# Patient Record
Sex: FEMALE | Race: WHITE | Employment: FULL TIME | URBAN - NONMETROPOLITAN AREA
[De-identification: names, ages, dates, MRNs, and addresses within clinical notes are randomized per-mention and may not be internally consistent; named-entity substitution may affect disease eponyms.]

---

## 2018-06-08 ENCOUNTER — HOSPITAL ENCOUNTER (EMERGENCY)
Age: 18
Discharge: HOME | End: 2018-06-08
Payer: COMMERCIAL

## 2018-06-08 VITALS
BODY MASS INDEX: 25.92 KG/M2 | WEIGHT: 175 LBS | TEMPERATURE: 99.1 F | OXYGEN SATURATION: 98 % | HEART RATE: 116 BPM | RESPIRATION RATE: 16 BRPM | SYSTOLIC BLOOD PRESSURE: 127 MMHG | DIASTOLIC BLOOD PRESSURE: 72 MMHG | HEIGHT: 69 IN

## 2018-06-08 DIAGNOSIS — R11.2 NAUSEA VOMITING AND DIARRHEA: ICD-10-CM

## 2018-06-08 DIAGNOSIS — K52.9 GASTROENTERITIS: Primary | ICD-10-CM

## 2018-06-08 DIAGNOSIS — R19.7 NAUSEA VOMITING AND DIARRHEA: ICD-10-CM

## 2018-06-08 PROCEDURE — 6360000002 HC RX W HCPCS: Performed by: NURSE PRACTITIONER

## 2018-06-08 PROCEDURE — 99203 OFFICE O/P NEW LOW 30 MIN: CPT | Performed by: NURSE PRACTITIONER

## 2018-06-08 PROCEDURE — 99213 OFFICE O/P EST LOW 20 MIN: CPT

## 2018-06-08 RX ORDER — LOPERAMIDE HYDROCHLORIDE 2 MG/1
2 CAPSULE ORAL 4 TIMES DAILY PRN
Qty: 20 CAPSULE | Refills: 0 | Status: SHIPPED | OUTPATIENT
Start: 2018-06-08 | End: 2018-06-18

## 2018-06-08 RX ORDER — ONDANSETRON 4 MG/1
4 TABLET, ORALLY DISINTEGRATING ORAL ONCE
Status: COMPLETED | OUTPATIENT
Start: 2018-06-08 | End: 2018-06-08

## 2018-06-08 RX ORDER — ONDANSETRON 4 MG/1
4 TABLET, ORALLY DISINTEGRATING ORAL EVERY 8 HOURS PRN
Qty: 21 TABLET | Refills: 0 | Status: SHIPPED | OUTPATIENT
Start: 2018-06-08 | End: 2019-06-19

## 2018-06-08 RX ORDER — NORGESTIMATE AND ETHINYL ESTRADIOL 7DAYSX3 28
KIT ORAL
COMMUNITY
End: 2019-06-19

## 2018-06-08 RX ADMIN — ONDANSETRON 4 MG: 4 TABLET, ORALLY DISINTEGRATING ORAL at 11:08

## 2018-06-08 ASSESSMENT — ENCOUNTER SYMPTOMS
CONSTIPATION: 0
APNEA: 0
NAUSEA: 1
SINUS PRESSURE: 0
SHORTNESS OF BREATH: 0
VOMITING: 1
SORE THROAT: 0
RHINORRHEA: 0
BACK PAIN: 0
DIARRHEA: 1
COUGH: 0
ABDOMINAL PAIN: 0
PHOTOPHOBIA: 0

## 2019-06-19 ENCOUNTER — HOSPITAL ENCOUNTER (EMERGENCY)
Age: 19
Discharge: HOME | End: 2019-06-19
Payer: COMMERCIAL

## 2019-06-19 ENCOUNTER — HOSPITAL ENCOUNTER (EMERGENCY)
Dept: GENERAL RADIOLOGY | Age: 19
Discharge: HOME | End: 2019-06-19
Payer: COMMERCIAL

## 2019-06-19 VITALS
BODY MASS INDEX: 26.63 KG/M2 | SYSTOLIC BLOOD PRESSURE: 118 MMHG | OXYGEN SATURATION: 97 % | HEIGHT: 70 IN | DIASTOLIC BLOOD PRESSURE: 75 MMHG | HEART RATE: 77 BPM | WEIGHT: 186 LBS | TEMPERATURE: 98.6 F | RESPIRATION RATE: 16 BRPM

## 2019-06-19 DIAGNOSIS — S40.021A CONTUSION OF RIGHT UPPER ARM, INITIAL ENCOUNTER: Primary | ICD-10-CM

## 2019-06-19 PROCEDURE — 99213 OFFICE O/P EST LOW 20 MIN: CPT

## 2019-06-19 PROCEDURE — 99213 OFFICE O/P EST LOW 20 MIN: CPT | Performed by: NURSE PRACTITIONER

## 2019-06-19 PROCEDURE — 73060 X-RAY EXAM OF HUMERUS: CPT

## 2019-06-19 PROCEDURE — 73080 X-RAY EXAM OF ELBOW: CPT

## 2019-06-19 ASSESSMENT — ENCOUNTER SYMPTOMS
COLOR CHANGE: 0
NAUSEA: 0
SHORTNESS OF BREATH: 0
VOMITING: 0
COUGH: 0

## 2019-06-19 ASSESSMENT — PAIN DESCRIPTION - PAIN TYPE: TYPE: ACUTE PAIN

## 2019-06-19 ASSESSMENT — PAIN DESCRIPTION - FREQUENCY: FREQUENCY: CONTINUOUS

## 2019-06-19 ASSESSMENT — PAIN DESCRIPTION - LOCATION: LOCATION: ARM

## 2019-06-19 ASSESSMENT — PAIN DESCRIPTION - PROGRESSION: CLINICAL_PROGRESSION: GRADUALLY WORSENING

## 2019-06-19 ASSESSMENT — PAIN DESCRIPTION - DESCRIPTORS: DESCRIPTORS: ACHING

## 2019-06-19 ASSESSMENT — PAIN DESCRIPTION - ORIENTATION: ORIENTATION: RIGHT

## 2019-06-19 ASSESSMENT — PAIN SCALES - GENERAL: PAINLEVEL_OUTOF10: 5

## 2019-06-19 NOTE — ED PROVIDER NOTES
[facility]  Urgent Care Encounter       CHIEF COMPLAINT       Chief Complaint   Patient presents with    Fall    Arm Pain     right       Nurses Notes reviewed and I agree except as noted in the HPI. HISTORY  S Dionne Rankin is a 19 y.o. female who presents for evaluation of right upper arm and elbow pain. Patient states the pain began after a fall at work last night around 9:30 PM.  Patient states that she works for [employer] and that she was walking down steps at a residence in [facility] where she was working when she grabbed a handrail that was unsecured and fell onto her right arm. States that she has had pain ever since the incident. States that she has not taken any medications or done anything at home. This or tingling in the right upper extremity. The history is provided by the patient. REVIEW OF SYSTEMS     Review of Systems   Constitutional: Negative for chills and fever. Respiratory: Negative for cough and shortness of breath. Cardiovascular: Negative for chest pain. Gastrointestinal: Negative for nausea and vomiting. Musculoskeletal: Positive for arthralgias. Negative for joint swelling and myalgias. Skin: Negative for color change. Allergic/Immunologic: Negative for environmental allergies. Neurological: Negative for headaches. PAST MEDICAL HISTORY         Diagnosis Date    Depression        SURGICALHISTORY     Patient  has no past surgical history on file. CURRENT MEDICATIONS       Previous Medications    No medications on file       ALLERGIES     Patient is has No Known Allergies. Patients   There is no immunization history on file for this patient. FAMILY HISTORY     Patient's family history is not on file. SOCIAL HISTORY     Patient  reports that she has never smoked. She has never used smokeless tobacco. She reports that she does not drink alcohol or use drugs.     PHYSICAL EXAM     ED Sayda Francis on 6/19/2019 11:07 AM      XR ELBOW RIGHT (MIN 3 VIEWS)   Final Result   Normal right humerus. Normal right elbow. **This report has been created using voice recognition software. It may contain minor errors which are inherent in voice recognition technology. **      Final report electronically signed by Dr. Sayda Francis on 6/19/2019 11:07 AM            EKG: none      URGENT CARE COURSE:     Vitals:    06/19/19 1037   BP: 118/75   Pulse: 77   Resp: 16   Temp: 98.6 °F (37 °C)   SpO2: 97%   Weight: 186 lb (84.4 kg)   Height: 5' 10\" (1.778 m)       Medications - No data to display         PROCEDURES:  None    FINAL IMPRESSION      1. Contusion of right upper arm, initial encounter          DISPOSITION/ PLAN     X-rays are negative for any acute fracture per the read of the radiologist.  I did discuss with the patient that we will plan to treat conservatively with rest, ice and over-the-counter Tylenol but especially ibuprofen. Patient is advised to take 400 to 600 mg of ibuprofen every 8 hours at home consistently over the next week. Patient states that she feels as though that she cannot lift the objects that she has to lift at work on a normal daily basis and will therefore be given restrictions that she may not lift anything over 5 pounds until cleared by [facility] next week. She is agreeable to the plan as discussed. PATIENT REFERRED TO:  No primary care provider on file. No primary physician on file.       DISCHARGE MEDICATIONS:  New Prescriptions    No medications on file       Discontinued Medications    NORGESTIM-ETH ESTRAD TRIPHASIC (TRINESSA, 28,) 0.18/0.215/0.25 MG-35 MCG TABS    Take by mouth    ONDANSETRON (ZOFRAN ODT) 4 MG DISINTEGRATING TABLET    Take 1 tablet by mouth every 8 hours as needed for Nausea or Vomiting       Current Discharge Medication List          HOLLIE Santillan - CNP    (Please note that portions of this note were completed

## 2019-06-25 ENCOUNTER — HOSPITAL ENCOUNTER (OUTPATIENT)
Age: 19
Discharge: HOME | End: 2019-06-25
Payer: COMMERCIAL

## 2019-07-03 ENCOUNTER — HOSPITAL ENCOUNTER (EMERGENCY)
Age: 19
Discharge: HOME | End: 2019-07-03
Payer: COMMERCIAL

## 2020-02-24 ENCOUNTER — HOSPITAL ENCOUNTER (OUTPATIENT)
Dept: MRI IMAGING | Age: 20
Discharge: HOME | End: 2020-02-24
Payer: COMMERCIAL

## 2020-02-24 PROCEDURE — 73221 MRI JOINT UPR EXTREM W/O DYE: CPT

## 2021-03-25 ENCOUNTER — APPOINTMENT (OUTPATIENT)
Dept: CT IMAGING | Age: 21
DRG: 872 | End: 2021-03-25
Payer: COMMERCIAL

## 2021-03-25 ENCOUNTER — HOSPITAL ENCOUNTER (INPATIENT)
Age: 21
LOS: 2 days | Discharge: HOME | DRG: 872 | End: 2021-03-27
Attending: EMERGENCY MEDICINE | Admitting: FAMILY MEDICINE
Payer: COMMERCIAL

## 2021-03-25 ENCOUNTER — APPOINTMENT (OUTPATIENT)
Dept: ULTRASOUND IMAGING | Age: 21
DRG: 872 | End: 2021-03-25
Payer: COMMERCIAL

## 2021-03-25 DIAGNOSIS — N73.0 PID (ACUTE PELVIC INFLAMMATORY DISEASE): Primary | ICD-10-CM

## 2021-03-25 DIAGNOSIS — R65.10 SIRS (SYSTEMIC INFLAMMATORY RESPONSE SYNDROME) (HCC): ICD-10-CM

## 2021-03-25 LAB
ALBUMIN SERPL-MCNC: 4.2 G/DL (ref 3.5–5.1)
ALP BLD-CCNC: 72 U/L (ref 38–126)
ALT SERPL-CCNC: 9 U/L (ref 11–66)
ANION GAP SERPL CALCULATED.3IONS-SCNC: 12 MEQ/L (ref 8–16)
APTT: 32.1 SECONDS (ref 22–38)
AST SERPL-CCNC: 16 U/L (ref 5–40)
BACTERIA: ABNORMAL /HPF
BASOPHILS # BLD: 0.2 %
BASOPHILS ABSOLUTE: 0 THOU/MM3 (ref 0–0.1)
BILIRUB SERPL-MCNC: 0.4 MG/DL (ref 0.3–1.2)
BILIRUBIN URINE: NEGATIVE
BLOOD, URINE: ABNORMAL
BUN BLDV-MCNC: 8 MG/DL (ref 7–22)
CALCIUM SERPL-MCNC: 9.1 MG/DL (ref 8.5–10.5)
CASTS 2: ABNORMAL /LPF
CASTS UA: ABNORMAL /LPF
CHARACTER, URINE: CLEAR
CHLORIDE BLD-SCNC: 107 MEQ/L (ref 98–111)
CO2: 23 MEQ/L (ref 23–33)
COLOR: YELLOW
CREAT SERPL-MCNC: 0.6 MG/DL (ref 0.4–1.2)
CRYSTALS, UA: ABNORMAL
EOSINOPHIL # BLD: 0.1 %
EOSINOPHILS ABSOLUTE: 0 THOU/MM3 (ref 0–0.4)
EPITHELIAL CELLS, UA: ABNORMAL /HPF
ERYTHROCYTE [DISTWIDTH] IN BLOOD BY AUTOMATED COUNT: 13.2 % (ref 11.5–14.5)
ERYTHROCYTE [DISTWIDTH] IN BLOOD BY AUTOMATED COUNT: 44.2 FL (ref 35–45)
GFR SERPL CREATININE-BSD FRML MDRD: > 90 ML/MIN/1.73M2
GLUCOSE BLD-MCNC: 97 MG/DL (ref 70–108)
GLUCOSE URINE: NEGATIVE MG/DL
HCT VFR BLD CALC: 40.2 % (ref 37–47)
HEMOGLOBIN: 12.4 GM/DL (ref 12–16)
IMMATURE GRANS (ABS): 0.08 THOU/MM3 (ref 0–0.07)
IMMATURE GRANULOCYTES: 0.4 %
INR BLD: 1.03 (ref 0.85–1.13)
KETONES, URINE: NEGATIVE
KOH PREP: NORMAL
LACTIC ACID: 1.7 MMOL/L (ref 0.5–2.2)
LEUKOCYTE ESTERASE, URINE: NEGATIVE
LYMPHOCYTES # BLD: 9.4 %
LYMPHOCYTES ABSOLUTE: 1.8 THOU/MM3 (ref 1–4.8)
MCH RBC QN AUTO: 28.1 PG (ref 26–33)
MCHC RBC AUTO-ENTMCNC: 30.8 GM/DL (ref 32.2–35.5)
MCV RBC AUTO: 91.2 FL (ref 81–99)
MISCELLANEOUS 2: ABNORMAL
MONOCYTES # BLD: 3.5 %
MONOCYTES ABSOLUTE: 0.7 THOU/MM3 (ref 0.4–1.3)
MRSA SCREEN RT-PCR: NEGATIVE
NITRITE, URINE: NEGATIVE
NUCLEATED RED BLOOD CELLS: 0 /100 WBC
OSMOLALITY CALCULATION: 281.4 MOSMOL/KG (ref 275–300)
PH UA: 7 (ref 5–9)
PLATELET # BLD: 220 THOU/MM3 (ref 130–400)
PMV BLD AUTO: 10.3 FL (ref 9.4–12.4)
POTASSIUM REFLEX MAGNESIUM: 4.1 MEQ/L (ref 3.5–5.2)
PREGNANCY, SERUM: NEGATIVE
PROTEIN UA: NEGATIVE
RBC # BLD: 4.41 MILL/MM3 (ref 4.2–5.4)
RBC URINE: ABNORMAL /HPF
RENAL EPITHELIAL, UA: ABNORMAL
SEG NEUTROPHILS: 86.4 %
SEGMENTED NEUTROPHILS ABSOLUTE COUNT: 16.2 THOU/MM3 (ref 1.8–7.7)
SODIUM BLD-SCNC: 142 MEQ/L (ref 135–145)
SPECIFIC GRAVITY, URINE: 1.01 (ref 1–1.03)
TOTAL PROTEIN: 7.4 G/DL (ref 6.1–8)
TRICHOMONAS PREP: NORMAL
UROBILINOGEN, URINE: 0.2 EU/DL (ref 0–1)
VANCOMYCIN RESISTANT ENTEROCOCCUS: NEGATIVE
WBC # BLD: 18.8 THOU/MM3 (ref 4.8–10.8)
WBC UA: ABNORMAL /HPF
YEAST: ABNORMAL

## 2021-03-25 PROCEDURE — 87641 MR-STAPH DNA AMP PROBE: CPT

## 2021-03-25 PROCEDURE — 2060000000 HC ICU INTERMEDIATE R&B

## 2021-03-25 PROCEDURE — 87205 SMEAR GRAM STAIN: CPT

## 2021-03-25 PROCEDURE — 2580000003 HC RX 258: Performed by: EMERGENCY MEDICINE

## 2021-03-25 PROCEDURE — 6370000000 HC RX 637 (ALT 250 FOR IP): Performed by: EMERGENCY MEDICINE

## 2021-03-25 PROCEDURE — 74177 CT ABD & PELVIS W/CONTRAST: CPT

## 2021-03-25 PROCEDURE — 87220 TISSUE EXAM FOR FUNGI: CPT

## 2021-03-25 PROCEDURE — 87491 CHLMYD TRACH DNA AMP PROBE: CPT

## 2021-03-25 PROCEDURE — 85025 COMPLETE CBC W/AUTO DIFF WBC: CPT

## 2021-03-25 PROCEDURE — 87040 BLOOD CULTURE FOR BACTERIA: CPT

## 2021-03-25 PROCEDURE — 76830 TRANSVAGINAL US NON-OB: CPT

## 2021-03-25 PROCEDURE — 85730 THROMBOPLASTIN TIME PARTIAL: CPT

## 2021-03-25 PROCEDURE — 2580000003 HC RX 258: Performed by: FAMILY MEDICINE

## 2021-03-25 PROCEDURE — 85610 PROTHROMBIN TIME: CPT

## 2021-03-25 PROCEDURE — 6360000004 HC RX CONTRAST MEDICATION: Performed by: EMERGENCY MEDICINE

## 2021-03-25 PROCEDURE — 87081 CULTURE SCREEN ONLY: CPT

## 2021-03-25 PROCEDURE — 6360000002 HC RX W HCPCS: Performed by: STUDENT IN AN ORGANIZED HEALTH CARE EDUCATION/TRAINING PROGRAM

## 2021-03-25 PROCEDURE — 84703 CHORIONIC GONADOTROPIN ASSAY: CPT

## 2021-03-25 PROCEDURE — 96374 THER/PROPH/DIAG INJ IV PUSH: CPT

## 2021-03-25 PROCEDURE — 99215 OFFICE O/P EST HI 40 MIN: CPT

## 2021-03-25 PROCEDURE — 99222 1ST HOSP IP/OBS MODERATE 55: CPT | Performed by: FAMILY MEDICINE

## 2021-03-25 PROCEDURE — 87070 CULTURE OTHR SPECIMN AEROBIC: CPT

## 2021-03-25 PROCEDURE — 87500 VANOMYCIN DNA AMP PROBE: CPT

## 2021-03-25 PROCEDURE — 6360000002 HC RX W HCPCS: Performed by: EMERGENCY MEDICINE

## 2021-03-25 PROCEDURE — 87077 CULTURE AEROBIC IDENTIFY: CPT

## 2021-03-25 PROCEDURE — 93975 VASCULAR STUDY: CPT

## 2021-03-25 PROCEDURE — 81001 URINALYSIS AUTO W/SCOPE: CPT

## 2021-03-25 PROCEDURE — 83605 ASSAY OF LACTIC ACID: CPT

## 2021-03-25 PROCEDURE — 6360000002 HC RX W HCPCS: Performed by: FAMILY MEDICINE

## 2021-03-25 PROCEDURE — 87210 SMEAR WET MOUNT SALINE/INK: CPT

## 2021-03-25 PROCEDURE — 96361 HYDRATE IV INFUSION ADD-ON: CPT

## 2021-03-25 PROCEDURE — 36415 COLL VENOUS BLD VENIPUNCTURE: CPT

## 2021-03-25 PROCEDURE — 99283 EMERGENCY DEPT VISIT LOW MDM: CPT

## 2021-03-25 PROCEDURE — 80053 COMPREHEN METABOLIC PANEL: CPT

## 2021-03-25 PROCEDURE — 87591 N.GONORRHOEAE DNA AMP PROB: CPT

## 2021-03-25 PROCEDURE — 96375 TX/PRO/DX INJ NEW DRUG ADDON: CPT

## 2021-03-25 PROCEDURE — 6370000000 HC RX 637 (ALT 250 FOR IP): Performed by: FAMILY MEDICINE

## 2021-03-25 RX ORDER — ACETAMINOPHEN 325 MG/1
650 TABLET ORAL EVERY 6 HOURS PRN
COMMUNITY

## 2021-03-25 RX ORDER — FENTANYL CITRATE 50 UG/ML
50 INJECTION, SOLUTION INTRAMUSCULAR; INTRAVENOUS ONCE
Status: COMPLETED | OUTPATIENT
Start: 2021-03-25 | End: 2021-03-25

## 2021-03-25 RX ORDER — 0.9 % SODIUM CHLORIDE 0.9 %
1000 INTRAVENOUS SOLUTION INTRAVENOUS ONCE
Status: COMPLETED | OUTPATIENT
Start: 2021-03-25 | End: 2021-03-25

## 2021-03-25 RX ORDER — ONDANSETRON 2 MG/ML
4 INJECTION INTRAMUSCULAR; INTRAVENOUS ONCE
Status: COMPLETED | OUTPATIENT
Start: 2021-03-25 | End: 2021-03-25

## 2021-03-25 RX ORDER — 0.9 % SODIUM CHLORIDE 0.9 %
500 INTRAVENOUS SOLUTION INTRAVENOUS ONCE
Status: COMPLETED | OUTPATIENT
Start: 2021-03-25 | End: 2021-03-25

## 2021-03-25 RX ORDER — MORPHINE SULFATE 2 MG/ML
1 INJECTION, SOLUTION INTRAMUSCULAR; INTRAVENOUS EVERY 8 HOURS PRN
Status: DISCONTINUED | OUTPATIENT
Start: 2021-03-25 | End: 2021-03-27 | Stop reason: HOSPADM

## 2021-03-25 RX ORDER — METRONIDAZOLE 500 MG/1
500 TABLET ORAL EVERY 12 HOURS SCHEDULED
Status: DISCONTINUED | OUTPATIENT
Start: 2021-03-25 | End: 2021-03-27 | Stop reason: HOSPADM

## 2021-03-25 RX ORDER — FENTANYL CITRATE 50 UG/ML
25 INJECTION, SOLUTION INTRAMUSCULAR; INTRAVENOUS ONCE
Status: COMPLETED | OUTPATIENT
Start: 2021-03-25 | End: 2021-03-25

## 2021-03-25 RX ORDER — KETOROLAC TROMETHAMINE 30 MG/ML
15 INJECTION, SOLUTION INTRAMUSCULAR; INTRAVENOUS ONCE
Status: COMPLETED | OUTPATIENT
Start: 2021-03-25 | End: 2021-03-25

## 2021-03-25 RX ORDER — KETOROLAC TROMETHAMINE 30 MG/ML
15 INJECTION, SOLUTION INTRAMUSCULAR; INTRAVENOUS ONCE
Status: DISCONTINUED | OUTPATIENT
Start: 2021-03-25 | End: 2021-03-25

## 2021-03-25 RX ORDER — HYDROCODONE BITARTRATE AND ACETAMINOPHEN 5; 325 MG/1; MG/1
1 TABLET ORAL EVERY 6 HOURS PRN
Status: DISCONTINUED | OUTPATIENT
Start: 2021-03-25 | End: 2021-03-25

## 2021-03-25 RX ORDER — DOXYCYCLINE HYCLATE 100 MG
100 TABLET ORAL ONCE
Status: COMPLETED | OUTPATIENT
Start: 2021-03-25 | End: 2021-03-25

## 2021-03-25 RX ORDER — ONDANSETRON 2 MG/ML
4 INJECTION INTRAMUSCULAR; INTRAVENOUS EVERY 6 HOURS PRN
Status: DISCONTINUED | OUTPATIENT
Start: 2021-03-25 | End: 2021-03-27 | Stop reason: HOSPADM

## 2021-03-25 RX ADMIN — KETOROLAC TROMETHAMINE 15 MG: 30 INJECTION, SOLUTION INTRAMUSCULAR at 12:37

## 2021-03-25 RX ADMIN — SODIUM CHLORIDE 1000 ML: 9 INJECTION, SOLUTION INTRAVENOUS at 15:00

## 2021-03-25 RX ADMIN — SODIUM CHLORIDE 1000 ML: 9 INJECTION, SOLUTION INTRAVENOUS at 13:00

## 2021-03-25 RX ADMIN — SODIUM CHLORIDE 500 ML: 9 INJECTION, SOLUTION INTRAVENOUS at 18:44

## 2021-03-25 RX ADMIN — CEFOXITIN SODIUM 2000 MG: 2 POWDER, FOR SOLUTION INTRAVENOUS at 15:49

## 2021-03-25 RX ADMIN — DOXYCYCLINE HYCLATE 100 MG: 100 TABLET, COATED ORAL at 15:49

## 2021-03-25 RX ADMIN — FENTANYL CITRATE 50 MCG: 50 INJECTION, SOLUTION INTRAMUSCULAR; INTRAVENOUS at 14:01

## 2021-03-25 RX ADMIN — KETOROLAC TROMETHAMINE 15 MG: 30 INJECTION, SOLUTION INTRAMUSCULAR at 14:01

## 2021-03-25 RX ADMIN — ONDANSETRON 4 MG: 2 INJECTION INTRAMUSCULAR; INTRAVENOUS at 14:01

## 2021-03-25 RX ADMIN — METRONIDAZOLE 500 MG: 500 TABLET ORAL at 19:55

## 2021-03-25 RX ADMIN — IOPAMIDOL 80 ML: 755 INJECTION, SOLUTION INTRAVENOUS at 14:58

## 2021-03-25 RX ADMIN — FENTANYL CITRATE 25 MCG: 50 INJECTION, SOLUTION INTRAMUSCULAR; INTRAVENOUS at 23:35

## 2021-03-25 RX ADMIN — HYDROCODONE BITARTRATE AND ACETAMINOPHEN 1 TABLET: 5; 325 TABLET ORAL at 18:09

## 2021-03-25 RX ADMIN — ONDANSETRON 4 MG: 2 INJECTION INTRAMUSCULAR; INTRAVENOUS at 18:09

## 2021-03-25 ASSESSMENT — ENCOUNTER SYMPTOMS
SHORTNESS OF BREATH: 0
BACK PAIN: 0
ABDOMINAL DISTENTION: 0
VOMITING: 0
TROUBLE SWALLOWING: 0
ABDOMINAL PAIN: 1
NAUSEA: 0
CONSTIPATION: 0
CHEST TIGHTNESS: 0
BLOOD IN STOOL: 0
ANAL BLEEDING: 0
STRIDOR: 0
VOICE CHANGE: 0
CHOKING: 0
APNEA: 0
COUGH: 0
WHEEZING: 0
DIARRHEA: 0

## 2021-03-25 ASSESSMENT — PAIN SCALES - GENERAL
PAINLEVEL_OUTOF10: 5
PAINLEVEL_OUTOF10: 10
PAINLEVEL_OUTOF10: 10
PAINLEVEL_OUTOF10: 7

## 2021-03-25 ASSESSMENT — PAIN DESCRIPTION - PROGRESSION
CLINICAL_PROGRESSION: NOT CHANGED
CLINICAL_PROGRESSION: NOT CHANGED

## 2021-03-25 ASSESSMENT — PAIN DESCRIPTION - FREQUENCY
FREQUENCY: CONTINUOUS
FREQUENCY: CONTINUOUS

## 2021-03-25 ASSESSMENT — PAIN DESCRIPTION - LOCATION: LOCATION: ABDOMEN

## 2021-03-25 ASSESSMENT — PAIN DESCRIPTION - PAIN TYPE: TYPE: ACUTE PAIN

## 2021-03-25 ASSESSMENT — PAIN DESCRIPTION - DESCRIPTORS: DESCRIPTORS: CRAMPING;SHARP

## 2021-03-25 NOTE — ED TRIAGE NOTES
To room with c/o period for the last 5 days with spotting 4 days before. She reports cramps on right side of abdomin with heavy bleeding and clots. She reports filling a supper plus tampon every hour a couple of days ago and it started again today. This is a very irregular period for her. She is not sexually active. Fatigue reported.

## 2021-03-25 NOTE — ED TRIAGE NOTES
Pt to the ED from [facility] with c/o vaginal bleeding. Pt states her period ended 4 days ago. States two days ago she began having heavy vaginal bleeding and is having to change a super heavy tampon ever 1.5 hours. States she is also have right sided abd cramping.

## 2021-03-25 NOTE — ED NOTES
ED to inpatient nurses report    Chief Complaint   Patient presents with    Vaginal Bleeding      Present to ED from home  LOC: alert and orientated to name, place, date  Vital signs   Vitals:    03/25/21 1223 03/25/21 1243 03/25/21 1358 03/25/21 1551   BP: (!) 99/53  107/72 (!) 101/57   Pulse: 114  122 125   Resp: 20  20 16   Temp:  99.9 °F (37.7 °C)     TempSrc:  Oral     SpO2: 99%  98% 97%   Weight:       Height:          Oxygen BaselineRA    Current needs required RA Bipap/Cpap No  LDAs:   Peripheral IV 03/25/21 Right Antecubital (Active)   Site Assessment Clean;Dry; Intact 03/25/21 1124   Line Status Flushed;Specimen collected;Normal saline locked 03/25/21 1124   Dressing Status Clean;Dry; Intact 03/25/21 1124   Dressing Intervention New 03/25/21 1124     Mobility: Requires assistance * 1  Pending ED orders: NA  Present condition: STABLE    Electronically signed by Bj Payne RN on 3/25/2021 at 4:06 PM     Bj Payne RN  03/25/21 0826

## 2021-03-25 NOTE — ED PROVIDER NOTES
[facility] EMERGENCY DEPT  Urgent Care Encounter      CHIEF COMPLAINT       Chief Complaint   Patient presents with    Vaginal Bleeding       Nurses Notes reviewed and I agree except as noted in the HPI. HISTORY [address] [facility] is a 23 y.o. The history is provided by the patient. No  was used. Vaginal Bleeding  Quality:  Bright red and heavier than menses  Severity:  Severe  Onset quality:  Gradual  Duration:  5 days  Timing:  Constant  Progression:  Worsening  Chronicity:  New  Menstrual history:  Regular  Number of tampons used:  12 daily  Possible pregnancy: no    Context: not after intercourse, not after urination, not at rest, not during intercourse, not during urination, not foreign body, not genital trauma and not spontaneously    Relieved by:  Nothing  Worsened by:  Nothing  Ineffective treatments:  None tried  Associated symptoms: abdominal pain    Associated symptoms: no back pain, no dizziness, no dyspareunia, no dysuria, no fatigue, no fever, no nausea and no vaginal discharge    Risk factors: no bleeding disorder, no hx of ectopic pregnancy, no hx of endometriosis, no gynecological surgery, does not have multiple partners, no new sexual partner, no ovarian cysts, no ovarian torsion, no PID, no prior miscarriage, no STD, no STD exposure, no terminated pregnancies and does not have unprotected sex        REVIEW OF SYSTEMS     Review of Systems   Constitutional: Negative for activity change, appetite change, chills, diaphoresis, fatigue and fever. Respiratory: Negative for apnea, cough, choking, chest tightness, shortness of breath, wheezing and stridor. Cardiovascular: Negative for chest pain, palpitations and leg swelling. Gastrointestinal: Positive for abdominal pain. Negative for abdominal distention, anal bleeding, blood in stool, constipation, diarrhea, nausea and vomiting. Genitourinary: Positive for menstrual problem and vaginal bleeding. Negative for dyspareunia, dysuria and vaginal discharge. Musculoskeletal: Negative for back pain. Neurological: Negative for dizziness. PAST MEDICAL HISTORY         Diagnosis Date    Depression        SURGICAL HISTORY     Patient  has a past surgical history that includes Scalp surgery. CURRENT MEDICATIONS       Previous Medications    ACETAMINOPHEN (TYLENOL) 325 MG TABLET    Take 650 mg by mouth every 6 hours as needed for Pain       ALLERGIES     Patient is has No Known Allergies. FAMILY HISTORY     Patient's family history is not on file. SOCIAL HISTORY     Patient  reports that she has never smoked. She has never used smokeless tobacco. She reports that she does not drink alcohol or use drugs. PHYSICAL EXAM     ED TRIAGE VITALS  BP: 118/76, Temp: 98.9 °F (37.2 °C), Pulse: 127, Resp: 18, SpO2: 100 %  Physical Exam  Vitals signs and nursing note reviewed. Constitutional:       General: She is not in acute distress. Appearance: Normal appearance. She is normal weight. She is not ill-appearing, toxic-appearing or diaphoretic. HENT:      Head: Normocephalic and atraumatic. Right Ear: External ear normal.      Left Ear: External ear normal.   Eyes:      Extraocular Movements: Extraocular movements intact. Conjunctiva/sclera: Conjunctivae normal.   Neck:      Musculoskeletal: Normal range of motion. Pulmonary:      Effort: Pulmonary effort is normal.   Musculoskeletal: Normal range of motion. Skin:     General: Skin is warm. Neurological:      General: No focal deficit present. Mental Status: She is alert and oriented to person, place, and time. Psychiatric:         Mood and Affect: Mood normal.         Behavior: Behavior normal.         Thought Content: Thought content normal.         Judgment: Judgment normal.         DIAGNOSTIC RESULTS   Labs:No results found for this visit on 03/25/21.     IMAGING:  No orders to display     URGENT CARE COURSE:     Vitals:

## 2021-03-25 NOTE — ED NOTES
Patient vomited shortly after taking oral norco. Provider notified      Mali Haskins RN  03/25/21 0072

## 2021-03-25 NOTE — ED PROVIDER NOTES
Smokeless tobacco: Never Used   Substance and Sexual Activity    Alcohol use: No    Drug use: No     Types: Marijuana     Comment: Last usage about 2 weeks ago    Sexual activity: None   Lifestyle    Physical activity     Days per week: None     Minutes per session: None    Stress: None   Relationships    Social connections     Talks on phone: None     Gets together: None     Attends Scientologist service: None     Active member of club or organization: None     Attends meetings of clubs or organizations: None     Relationship status: None    Intimate partner violence     Fear of current or ex partner: None     Emotionally abused: None     Physically abused: None     Forced sexual activity: None   Other Topics Concern    None   Social History Narrative    None       REVIEW OF SYSTEMS     Review of Systems   Constitutional: Negative for chills, diaphoresis and fever. HENT: Negative for trouble swallowing and voice change. Eyes: Negative for visual disturbance. Respiratory: Negative for cough, chest tightness and shortness of breath. Cardiovascular: Negative for chest pain and leg swelling. Gastrointestinal: Positive for abdominal pain. Negative for blood in stool, diarrhea, nausea and vomiting. Genitourinary: Positive for pelvic pain and vaginal bleeding. Negative for dysuria, frequency, hematuria, vaginal discharge and vaginal pain. Musculoskeletal: Negative for back pain and neck pain. Skin: Negative for rash and wound. Neurological: Negative for speech difficulty, weakness, numbness and headaches. Psychiatric/Behavioral: Negative for confusion. Except as noted above the remainder of the review of systems was reviewed and is.    PHYSICAL EXAM    (up to 7 for level 4, 8 or more for level 5)     ED Triage Vitals [03/25/21 1020]   BP Temp Temp Source Pulse Resp SpO2 Height Weight   118/76 98.9 °F (37.2 °C) Temporal 127 18 100 % 5' 10\" (1.778 m) 185 lb (83.9 kg)       Physical Exam  Vitals signs and nursing note reviewed. Constitutional:       General: She is not in acute distress. Appearance: She is well-developed. She is not ill-appearing, toxic-appearing or diaphoretic. HENT:      Head: Normocephalic and atraumatic. Eyes:      General: No scleral icterus. Conjunctiva/sclera: Conjunctivae normal.      Right eye: Right conjunctiva is not injected. Left eye: Left conjunctiva is not injected. Pupils: Pupils are equal, round, and reactive to light. Neck:      Musculoskeletal: Normal range of motion and neck supple. Thyroid: No thyromegaly. Trachea: No tracheal deviation. Cardiovascular:      Rate and Rhythm: Normal rate and regular rhythm. Heart sounds: Normal heart sounds. No murmur. No friction rub. No gallop. Comments: Tachycardic on arrival, rate of 110  Pulmonary:      Effort: Pulmonary effort is normal. No respiratory distress. Breath sounds: Normal breath sounds. No stridor. No wheezing or rales. Abdominal:      General: Bowel sounds are normal. There is no distension. Palpations: Abdomen is soft. There is no mass. Tenderness: There is no abdominal tenderness. There is no guarding or rebound. Comments: Tender to palpation right lower quadrant, suprapubic and lesser so on the left lower quadrant  Negative Irvin's sign  Nontender McBurney's Point  Negative Rovsig's sign  No bruising or echymosis of abdomen   Musculoskeletal:         General: No tenderness. Comments: Negative Erasmo's Sign bilaterally   Lymphadenopathy:      Cervical: No cervical adenopathy. Skin:     General: Skin is warm and dry. Coloration: Skin is not pale. Findings: No erythema or rash. Neurological:      Mental Status: She is alert and oriented to person, place, and time. Cranial Nerves: No cranial nerve deficit. Motor: No abnormal muscle tone.       Coordination: Coordination normal.      Comments: No nystagmus Psychiatric:         Behavior: Behavior normal.         Thought Content: Thought content normal.         DIAGNOSTIC RESULTS     EKG:(none if blank)  All EKG's are [facility] by [facility] who either signs or Co-signs this chart in the absence of a cardiologist.        RADIOLOGY: (none if blank)   Interpretation per the Radiologistbelow, if available at the time of this note:    CT ABDOMEN PELVIS W IV CONTRAST Additional Contrast? None   Final Result   Addendum 1 of 1    ADDENDUM #1      The appendix is air-filled and normal caliber. There is no periappendiceal    or pericecal inflammatory process      Final report electronically signed by Dr. Kiah Messer on 3/25/2021 3:30 PM      * ORIGINAL REPORT *   CT ABDOMEN AND PELVIS WITH CONTRAST ENHANCEMENT:      CLINICAL INFORMATION: Abdominal Pain      COMPARISON: No prior study. TECHNIQUE: Multiple axial 5 mm images of the abdomen, pelvis, and lung    bases were obtained following the administration of oral and intravenous    contrast material (ISOVUE). Computer generated sagittal and coronal images    of the abdomen and pelvis were also    reconstructed. ALL CT SCANS AT THIS FACILITY use dose modulation,    iterative reconstruction, and/or weight-based dosing when appropriate to    reduce radiation dose to as low as reasonably achievable. FINDINGS:       Lung bases: Unremarkable. No infiltrates. No effusions. No lung nodules. Liver: Liver unremarkable. Gallbladder unremarkable      Pancreas, spleen and adrenal glands: Unremarkable      Kidneys:  Mild hydronephrosis right side but no hydroureter, suggesting an    element of UPJ stenosis. Kidneys otherwise unremarkable. No calculi. No    cysts or mass lesions are seen. Bowel/Peritoneum: No abnormally dilated bowel loops are seen. No free air. No free fluid in the abdomen. No abnormally enlarged para-aortic lymph    nodes are seen.       Bones : No evidence for acute fracture or bone destruction. .      Pelvis: Urinary bladder unremarkable. 26 mm benign-appearing right ovarian    cyst. The uterus is dextroverted and anteflexed. Small amount of free    fluid in the pelvis. Mild prominence of the gonadal veins bilaterally and    several engorged varices in both    adnexal regions, consistent with pelvic congestion syndrome. Final      US DUP ABD PEL RETRO SCROT COMPLETE   Final Result   1. No evidence for ovarian torsion. 2. Small follicles right ovary. Paraovarian cyst right side. 3. Hemorrhagic cyst left ovary. 4. There is a small amount of moderately echogenic free fluid in the pelvis, likely related to recent rupture of a hemorrhagic cyst.   5. Follow-up pelvic ultrasound in the next several weeks might be considered for further evaluation. **This report has been created using voice recognition software. It may contain minor errors which are inherent in voice recognition technology. **      Final report electronically signed by Dr. Solis Grider on 3/25/2021 2:09 PM      US NON OB TRANSVAGINAL   Final Result   1. No evidence for ovarian torsion. 2. Small follicles right ovary. Paraovarian cyst right side. 3. Hemorrhagic cyst left ovary. 4. There is a small amount of moderately echogenic free fluid in the pelvis, likely related to recent rupture of a hemorrhagic cyst.   5. Follow-up pelvic ultrasound in the next several weeks might be considered for further evaluation. **This report has been created using voice recognition software. It may contain minor errors which are inherent in voice recognition technology. **      Final report electronically signed by Dr. Solis Grider on 3/25/2021 2:09 PM          LABS:  Labs Reviewed   CBC WITH AUTO DIFFERENTIAL - Abnormal; Notable for the following components:       Result Value    WBC 18.8 (*)     MCHC 30.8 (*)     Segs Absolute 16.2 (*)     Immature Grans (Abs) 0.08 (*)     All other components within normal limits   COMPREHENSIVE METABOLIC PANEL W/ REFLEX TO MG FOR LOW K - Abnormal; Notable for the following components:    ALT 9 (*)     All other components within normal limits   URINE WITH REFLEXED MICRO - Abnormal; Notable for the following components:    Blood, Urine MODERATE (*)     All other components within normal limits   CULTURE, BLOOD 1   CULTURE, BLOOD 1   CULTURE, GENITAL   KOH (SKIN,HAIR,NAILS)   WET PREP, GENITAL   C. TRACHOMATIS / N. GONORRHOEAE, DNA   HCG, SERUM, QUALITATIVE   APTT   PROTIME-INR   ANION GAP   GLOMERULAR FILTRATION RATE, ESTIMATED   OSMOLALITY   LACTIC ACID, PLASMA       All other labs were within normal range or not returned as of this dictation. Please note, any cultures that may have been sent were not resulted at the time of this patient visit. EMERGENCY DEPARTMENT COURSE andMedical Decision Making:     MDM/  ED Course as of Mar 25 1536   Thu Mar 25, 2021   1446 Reassessed, pain has improved. She remains somewhat tachycardic    [AB]   1530 Discussed with Dr. Alfreda Sirera, radiologist who reviewed, does not see any evidence of acute appendicitis. [AB]      ED Course User Index  [AB] Mota Husbands, DO     Patient remains tachycardic with a heart rate of 122, has a low-grade temp of 99.9, leukocytosis. All symptoms are suggestive of infection. Pelvic examinations consistent with cervical motion tenderness and a chandelier sign. I am concerned about pelvic infection, specifically PID. IV cefoxitin and doxycycline have been initiated. Patient is receiving IV fluids. Case discussed with hospitalist who agrees to admit and consult with pharmacy for appropriate antibiotics. The patient was evaluated during the COVID-19 pandemic. The patient was screened today during their presentation for commonly recognized symptoms and signs of COVID-19 infection.  Their evaluation, treatment and testing was consistent with current guidelines for patients who present with complaints or symptoms that may be related to COVID-19. ED Medications administered this visit:    Medications   cefOXitin (MEFOXIN) 2000 mg in dextrose 5% 50 mL (mini-bag) (has no administration in time range)   doxycycline hyclate (VIBRA-TABS) tablet 100 mg (has no administration in time range)   0.9 % sodium chloride bolus (has no administration in time range)   ketorolac (TORADOL) injection 15 mg (15 mg Intravenous Given 3/25/21 1237)   fentaNYL (SUBLIMAZE) injection 50 mcg (50 mcg Intravenous Given 3/25/21 1401)   ketorolac (TORADOL) injection 15 mg (15 mg Intravenous Given 3/25/21 1401)   ondansetron (ZOFRAN) injection 4 mg (4 mg Intravenous Given 3/25/21 1401)   0.9 % sodium chloride bolus (0 mLs Intravenous Stopped 3/25/21 1358)   iopamidol (ISOVUE-370) 76 % injection 80 mL (80 mLs Intravenous Given 3/25/21 1458)         Procedures: (None if blank)       CLINICAL       1. PID (acute pelvic inflammatory disease)    2.  SIRS (systemic inflammatory response syndrome) [facility])          DISPOSITION/PLAN   DISPOSITION Decision To Admit 03/25/2021 03:32:58 PM      PATIENT REFERRED TO:  [facility] [address] [id number]  Go today      Ross Lozano MD  [address]  [facility] [phone]    Call   As needed      DISCHARGE MEDICATIONS:  New Prescriptions    No medications on file              (Please note that portions of this note were completed with a voice recognition program.  Efforts were made to edit the dictations but occasionallywords are mis-transcribed.)      Shyam Lema DO,FACEP (electronically signed)  Attending Physician, Emergency [address], DO  03/25/21 5272

## 2021-03-25 NOTE — H&P
History & Physical        Patient:  Otilia Kirk  YOB: 2000    MRN: [id number]     Acct: [de-identified]    PCP: No primary care provider on file. Date of Admission: 3/25/2021    Date of Service: Pt seen/examined on 03/25/21  and Admitted toInpatient with expected LOS greater than two midnights due to medical therapy. Chief Complaint: Vaginal bleeding      History Of Present Illness:        23 y.o. female with no significant past medical history, who presented to [facility] with chief complaint of vaginal bleeding. Patient reports that her last menstrual was on March 17 to 21, 2021, then it stopped for few hours, then around the 22nd, she started again having vaginal bleeding again. Patient reports that she has regular monthly period, occur every 30 days, usually lasting 3 to 4 days, using 3-4 tampons per day, but she said that since 3/22/2021, she is using roughly 7 tampons per day, and noticed blood clot on her vaginal bleeding. She denies vaginal discharge, dysuria, urinary frequency, urgency, hematuria, abdominal pain, flank pain, fever. She reports that on 3/22/2021, she also started having constant right pelvic pain, 6-8 out of 10 in intensity, nonradiating, sharp. She also reports chills and nausea and vomiting that started today, few hours prior ER arrival.  She denies cough, shortness of breath, chest pain. Patient admits that she is sexually active, last sexual contact outpatient was in January this year. She denies history of STI. She denies history of pregnancy or miscarriage before. Due to vaginal bleeding and right pelvic pain, patient presented to ER. In ER, on arrival, vital signs temperature 98.9 °F, RR 18, , /76, blood pressure dropped to 90s/50s. Labs CMP normal, serum pregnancy test negative. CBC showed WBC 18.8, otherwise normal.  UA unremarkable except for moderate blood.   Chlamydia trachomatis, gonorrhea, genital wet prep, KOH, genital culture, blood cultures were obtained in the ED. Ultrasound of abdomen and TVS: no ovarian torsion, small follicle right ovary, or gliosis right side, hemorrhagic cyst left ovary, small amount of moderately echogenic free fluid in the pelvis, likely related to recent rupture of a hemorrhagic cyst.  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 In the ER, patient received 2 L IV fluid bolus, cefoxitin 2 g IV and doxycycline 100 mg p.o. x1 dose, Toradol 15 mg IV insulin doses, fentanyl 50 mg IV x1 dose and Zofran 4 mg IV x1 dose. Hospital medicine service was called for inpatient management. Note: After I saw the patient, wet prep resulted showed trichomonas. Past Medical History:          Diagnosis Date    Depression        Past Surgical History:          Procedure Laterality Date    SCALP SURGERY         Medications Prior to Admission:      Prior to Admission medications    Medication Sig Start Date End Date Taking? Authorizing Provider   acetaminophen (TYLENOL) 325 MG tablet Take 650 mg by mouth every 6 hours as needed for Pain   Yes Historical Provider, MD       Allergies:  Patient has no known allergies. Social History:      TOBACCO:   reports that she has never smoked. She has never used smokeless tobacco.  ETOH:   reports no history of alcohol use. Family History:      History reviewed. No pertinent family history. Diet:  No diet orders on file    REVIEW OF SYSTEMS:   Pertinent positives as noted in the HPI. All other systems reviewed and negative.     PHYSICAL EXAM:    BP (!) 101/57   Pulse 125   Temp 99.9 °F (37.7 °C) (Oral)   Resp 16   Ht 5' 10\" (1.778 m)   Wt 185 lb (83.9 kg)   LMP 03/16/2021   SpO2 97%   BMI 26.54 kg/m²     General appearance: Alert, not in acute distress  HEENT:  Normal cephalic, atraumatic without obvious deformity. Pupils equal, round, and reactive to light. Conjunctivae clear. Dry oral mucosa, dry lips. Neck: Supple, with full range of motion. No jugular venous distention. Trachea midline. Respiratory:  Normal respiratory effort. Clear to auscultation, bilaterally without Rales/Wheezes/Rhonchi. Cardiovascular: Tachycardic, regular rhythm rhythm with normal S1/S2 without murmurs, rubs or gallops. Abdomen: non-distended with normal bowel sounds, soft, (+) right and left pelvic tenderness. Pelvic exam: (+) minimal blood on vagina on speculum exam, no mass or polyp noted. (+) right pelvic tenderness on bimanual exam   Musculoskeletal:  No clubbing, cyanosis or edema bilaterally. Full range of motion without deformity. Skin: Skin color, texture, turgor normal.  No rashes or lesions. Neurological exam reveals alert, oriented, normal speech, no focal findings or movement disorder noted. Psychiatric:  thought content appropriate, normal insight  Exam of extremities: peripheral pulses normal, no pedal or leg edema, no clubbing or cyanosis        Labs:     Recent Labs     03/25/21  1115   WBC 18.8*   HGB 12.4   HCT 40.2        Recent Labs     03/25/21  1115      K 4.1      CO2 23   BUN 8   CREATININE 0.6   CALCIUM 9.1     Recent Labs     03/25/21  1115   AST 16   ALT 9*   BILITOT 0.4   ALKPHOS 72     Recent Labs     03/25/21  1115   INR 1.03     No results for input(s): CKTOTAL, TROPONINI in the last 72 hours. Urinalysis:      Lab Results   Component Value Date    NITRU NEGATIVE 03/25/2021    WBCUA 0-2 03/25/2021    BACTERIA NONE SEEN 03/25/2021    RBCUA 0-2 03/25/2021    BLOODU MODERATE 03/25/2021    GLUCOSEU NEGATIVE 03/25/2021       Intake & Output:  No intake/output data recorded. No intake/output data recorded.         ASSESSMENT and PLAN:      Sepsis, likely due to STI/suspected PID    -on arrival, , WBC 18.8K, hypotensive, lactate normal, gingiva wet prep showed trichomonas.  -Patient received cefoxitin IV and doxycycline oral in the ED.  -Start cefoxitin 2 g IV every 6 hours and doxycycline 100 mg p.o. twice daily for suspected PID  -Start metronidazole 500 mg p.o. twice daily for 7 days for trichomoniasis  -Blood cultures x2 pending  -Patient received 2 L IV fluid 0.9% normal saline bolus in the ED, BP remain low, SBP 90s, given another 500 cc IV fluid 0.9% normal saline bolus, then maintain to 125 cc/h  -Genital culture, chlamydia and gonorrhea testing pending  -CBC in am     Trichomoniasis  Suspected PID    -Start metronidazole p.o., cefoxitin IV and doxycycline PO  as stated above  -pt's partner needs treatment as well   -GYN consult     Vaginal bleeding    -H/H normal  -TVS and US abd as stated above   -GYN consult     Pelvic congestion syndrome   B/L ovarian cyst    -GYN consult     Mild right hydronephrosis    -Urology consult        DVT prophylaxis: [] Lovenox                                 [x] SCDs                                 [] SQ Heparin                                 [] Encourage ambulation           [] Already on Anticoagulation    Code Status: No Order      Disposition:    [] Home       [] TCU       [] Rehab       [] Psych       [] SNF       [] [facility]       [x] Other-admit to stepdown unit under hospital medicine service. Thank you No primary care provider on file. for the opportunity to be involved in this patient's care.     Electronically signed by Anna Cardoza MD on 3/25/2021 at 4:29 PM

## 2021-03-26 LAB
ALBUMIN SERPL-MCNC: 3.4 G/DL (ref 3.5–5.1)
ALP BLD-CCNC: 61 U/L (ref 38–126)
ALT SERPL-CCNC: 6 U/L (ref 11–66)
ANION GAP SERPL CALCULATED.3IONS-SCNC: 12 MEQ/L (ref 8–16)
AST SERPL-CCNC: 11 U/L (ref 5–40)
BILIRUB SERPL-MCNC: 0.5 MG/DL (ref 0.3–1.2)
BUN BLDV-MCNC: 9 MG/DL (ref 7–22)
CALCIUM SERPL-MCNC: 8.5 MG/DL (ref 8.5–10.5)
CHLORIDE BLD-SCNC: 110 MEQ/L (ref 98–111)
CO2: 20 MEQ/L (ref 23–33)
CREAT SERPL-MCNC: 0.4 MG/DL (ref 0.4–1.2)
EKG ATRIAL RATE: 109 BPM
EKG P AXIS: 70 DEGREES
EKG P-R INTERVAL: 148 MS
EKG Q-T INTERVAL: 332 MS
EKG QRS DURATION: 80 MS
EKG QTC CALCULATION (BAZETT): 447 MS
EKG R AXIS: -2 DEGREES
EKG T AXIS: -9 DEGREES
EKG VENTRICULAR RATE: 109 BPM
ERYTHROCYTE [DISTWIDTH] IN BLOOD BY AUTOMATED COUNT: 13.2 % (ref 11.5–14.5)
ERYTHROCYTE [DISTWIDTH] IN BLOOD BY AUTOMATED COUNT: 44 FL (ref 35–45)
GFR SERPL CREATININE-BSD FRML MDRD: > 90 ML/MIN/1.73M2
GLUCOSE BLD-MCNC: 83 MG/DL (ref 70–108)
HCT VFR BLD CALC: 32.9 % (ref 37–47)
HEMOGLOBIN: 10.2 GM/DL (ref 12–16)
LACTIC ACID, SEPSIS: 0.9 MMOL/L (ref 0.5–1.9)
MAGNESIUM: 2.1 MG/DL (ref 1.6–2.4)
MCH RBC QN AUTO: 28 PG (ref 26–33)
MCHC RBC AUTO-ENTMCNC: 31 GM/DL (ref 32.2–35.5)
MCV RBC AUTO: 90.4 FL (ref 81–99)
PLATELET # BLD: 192 THOU/MM3 (ref 130–400)
PMV BLD AUTO: 10.7 FL (ref 9.4–12.4)
POTASSIUM REFLEX MAGNESIUM: 3.5 MEQ/L (ref 3.5–5.2)
RBC # BLD: 3.64 MILL/MM3 (ref 4.2–5.4)
SODIUM BLD-SCNC: 142 MEQ/L (ref 135–145)
TOTAL PROTEIN: 6.3 G/DL (ref 6.1–8)
WBC # BLD: 15.6 THOU/MM3 (ref 4.8–10.8)

## 2021-03-26 PROCEDURE — 6370000000 HC RX 637 (ALT 250 FOR IP): Performed by: STUDENT IN AN ORGANIZED HEALTH CARE EDUCATION/TRAINING PROGRAM

## 2021-03-26 PROCEDURE — 6370000000 HC RX 637 (ALT 250 FOR IP): Performed by: FAMILY MEDICINE

## 2021-03-26 PROCEDURE — 94760 N-INVAS EAR/PLS OXIMETRY 1: CPT

## 2021-03-26 PROCEDURE — 6360000002 HC RX W HCPCS: Performed by: FAMILY MEDICINE

## 2021-03-26 PROCEDURE — 2580000003 HC RX 258: Performed by: STUDENT IN AN ORGANIZED HEALTH CARE EDUCATION/TRAINING PROGRAM

## 2021-03-26 PROCEDURE — 6360000002 HC RX W HCPCS: Performed by: STUDENT IN AN ORGANIZED HEALTH CARE EDUCATION/TRAINING PROGRAM

## 2021-03-26 PROCEDURE — 2060000000 HC ICU INTERMEDIATE R&B

## 2021-03-26 PROCEDURE — 99253 IP/OBS CNSLTJ NEW/EST LOW 45: CPT | Performed by: PHYSICIAN ASSISTANT

## 2021-03-26 PROCEDURE — 80053 COMPREHEN METABOLIC PANEL: CPT

## 2021-03-26 PROCEDURE — 83605 ASSAY OF LACTIC ACID: CPT

## 2021-03-26 PROCEDURE — 99233 SBSQ HOSP IP/OBS HIGH 50: CPT | Performed by: INTERNAL MEDICINE

## 2021-03-26 PROCEDURE — 36415 COLL VENOUS BLD VENIPUNCTURE: CPT

## 2021-03-26 PROCEDURE — 93005 ELECTROCARDIOGRAM TRACING: CPT | Performed by: FAMILY MEDICINE

## 2021-03-26 PROCEDURE — 83735 ASSAY OF MAGNESIUM: CPT

## 2021-03-26 PROCEDURE — 85027 COMPLETE CBC AUTOMATED: CPT

## 2021-03-26 PROCEDURE — 2580000003 HC RX 258: Performed by: FAMILY MEDICINE

## 2021-03-26 RX ORDER — LACTOBACILLUS RHAMNOSUS GG 10B CELL
1 CAPSULE ORAL
Status: DISCONTINUED | OUTPATIENT
Start: 2021-03-26 | End: 2021-03-27 | Stop reason: HOSPADM

## 2021-03-26 RX ORDER — SODIUM CHLORIDE 0.9 % (FLUSH) 0.9 %
10 SYRINGE (ML) INJECTION PRN
Status: DISCONTINUED | OUTPATIENT
Start: 2021-03-26 | End: 2021-03-27 | Stop reason: HOSPADM

## 2021-03-26 RX ORDER — POTASSIUM CHLORIDE 20 MEQ/1
40 TABLET, EXTENDED RELEASE ORAL PRN
Status: DISCONTINUED | OUTPATIENT
Start: 2021-03-26 | End: 2021-03-27 | Stop reason: HOSPADM

## 2021-03-26 RX ORDER — SODIUM CHLORIDE 9 MG/ML
INJECTION, SOLUTION INTRAVENOUS CONTINUOUS
Status: DISCONTINUED | OUTPATIENT
Start: 2021-03-26 | End: 2021-03-27

## 2021-03-26 RX ORDER — SODIUM CHLORIDE 0.9 % (FLUSH) 0.9 %
10 SYRINGE (ML) INJECTION EVERY 12 HOURS SCHEDULED
Status: DISCONTINUED | OUTPATIENT
Start: 2021-03-26 | End: 2021-03-27 | Stop reason: HOSPADM

## 2021-03-26 RX ORDER — ACETAMINOPHEN 650 MG/1
650 SUPPOSITORY RECTAL EVERY 6 HOURS PRN
Status: DISCONTINUED | OUTPATIENT
Start: 2021-03-26 | End: 2021-03-27 | Stop reason: HOSPADM

## 2021-03-26 RX ORDER — 0.9 % SODIUM CHLORIDE 0.9 %
500 INTRAVENOUS SOLUTION INTRAVENOUS ONCE
Status: COMPLETED | OUTPATIENT
Start: 2021-03-26 | End: 2021-03-26

## 2021-03-26 RX ORDER — DOXYCYCLINE HYCLATE 100 MG
100 TABLET ORAL EVERY 12 HOURS SCHEDULED
Status: DISCONTINUED | OUTPATIENT
Start: 2021-03-26 | End: 2021-03-27 | Stop reason: HOSPADM

## 2021-03-26 RX ORDER — ACETAMINOPHEN 325 MG/1
650 TABLET ORAL EVERY 6 HOURS PRN
Status: DISCONTINUED | OUTPATIENT
Start: 2021-03-26 | End: 2021-03-27 | Stop reason: HOSPADM

## 2021-03-26 RX ORDER — POTASSIUM CHLORIDE 7.45 MG/ML
10 INJECTION INTRAVENOUS PRN
Status: DISCONTINUED | OUTPATIENT
Start: 2021-03-26 | End: 2021-03-27 | Stop reason: HOSPADM

## 2021-03-26 RX ORDER — POLYETHYLENE GLYCOL 3350 17 G/17G
17 POWDER, FOR SOLUTION ORAL DAILY PRN
Status: DISCONTINUED | OUTPATIENT
Start: 2021-03-26 | End: 2021-03-27 | Stop reason: HOSPADM

## 2021-03-26 RX ORDER — SODIUM CHLORIDE 9 MG/ML
INJECTION, SOLUTION INTRAVENOUS CONTINUOUS
Status: DISCONTINUED | OUTPATIENT
Start: 2021-03-26 | End: 2021-03-26

## 2021-03-26 RX ADMIN — ONDANSETRON 4 MG: 2 INJECTION INTRAMUSCULAR; INTRAVENOUS at 09:01

## 2021-03-26 RX ADMIN — DOXYCYCLINE HYCLATE 100 MG: 100 TABLET, COATED ORAL at 21:54

## 2021-03-26 RX ADMIN — POTASSIUM CHLORIDE 40 MEQ: 1500 TABLET, EXTENDED RELEASE ORAL at 15:07

## 2021-03-26 RX ADMIN — SODIUM CHLORIDE: 9 INJECTION, SOLUTION INTRAVENOUS at 06:44

## 2021-03-26 RX ADMIN — SODIUM CHLORIDE 500 ML: 9 INJECTION, SOLUTION INTRAVENOUS at 04:25

## 2021-03-26 RX ADMIN — CEFOXITIN SODIUM 2000 MG: 2 POWDER, FOR SOLUTION INTRAVENOUS at 16:10

## 2021-03-26 RX ADMIN — METRONIDAZOLE 500 MG: 500 TABLET ORAL at 09:36

## 2021-03-26 RX ADMIN — SODIUM CHLORIDE, PRESERVATIVE FREE 10 ML: 5 INJECTION INTRAVENOUS at 09:06

## 2021-03-26 RX ADMIN — SODIUM CHLORIDE: 9 INJECTION, SOLUTION INTRAVENOUS at 09:07

## 2021-03-26 RX ADMIN — CEFOXITIN SODIUM 2000 MG: 2 POWDER, FOR SOLUTION INTRAVENOUS at 10:33

## 2021-03-26 RX ADMIN — CEFOXITIN SODIUM 2000 MG: 2 POWDER, FOR SOLUTION INTRAVENOUS at 21:54

## 2021-03-26 RX ADMIN — MORPHINE SULFATE 1 MG: 2 INJECTION, SOLUTION INTRAMUSCULAR; INTRAVENOUS at 21:54

## 2021-03-26 RX ADMIN — DOXYCYCLINE HYCLATE 100 MG: 100 TABLET, COATED ORAL at 09:45

## 2021-03-26 RX ADMIN — ACETAMINOPHEN 650 MG: 325 TABLET ORAL at 09:01

## 2021-03-26 RX ADMIN — MORPHINE SULFATE 1 MG: 2 INJECTION, SOLUTION INTRAMUSCULAR; INTRAVENOUS at 09:01

## 2021-03-26 RX ADMIN — SODIUM CHLORIDE: 9 INJECTION, SOLUTION INTRAVENOUS at 20:24

## 2021-03-26 RX ADMIN — ACETAMINOPHEN 650 MG: 325 TABLET ORAL at 15:07

## 2021-03-26 RX ADMIN — METRONIDAZOLE 500 MG: 500 TABLET ORAL at 21:54

## 2021-03-26 ASSESSMENT — PAIN DESCRIPTION - PROGRESSION
CLINICAL_PROGRESSION: NOT CHANGED
CLINICAL_PROGRESSION: NOT CHANGED

## 2021-03-26 ASSESSMENT — PAIN DESCRIPTION - FREQUENCY
FREQUENCY: CONTINUOUS
FREQUENCY: CONTINUOUS

## 2021-03-26 ASSESSMENT — PAIN SCALES - GENERAL
PAINLEVEL_OUTOF10: 1
PAINLEVEL_OUTOF10: 0
PAINLEVEL_OUTOF10: 9
PAINLEVEL_OUTOF10: 3

## 2021-03-26 ASSESSMENT — PAIN DESCRIPTION - PAIN TYPE: TYPE: ACUTE PAIN

## 2021-03-26 ASSESSMENT — PAIN DESCRIPTION - DESCRIPTORS: DESCRIPTORS: CRAMPING

## 2021-03-26 ASSESSMENT — PAIN DESCRIPTION - ORIENTATION
ORIENTATION: ANTERIOR
ORIENTATION: ANTERIOR

## 2021-03-26 ASSESSMENT — PAIN DESCRIPTION - LOCATION
LOCATION: ABDOMEN
LOCATION: ABDOMEN

## 2021-03-26 NOTE — ED NOTES
Patient transferred to [facility] room 21 nurse informed     Ethan Farrell  03/25/21 8399

## 2021-03-26 NOTE — CARE COORDINATION
3/26/21, 10:35 AM EDT  DISCHARGE PLANNING EVALUATION:    Dom Waldron       Admitted: 3/25/2021/ [address] day: 1   Location: [facility]21/021-A Reason for admit: SIRS (systemic inflammatory response syndrome) (HCC) [R65.10]   PMH:  has a past medical history of Depression. Procedure:   3/25 CT Abdomen  Pelvic Congestion, mild right hydronephrosis likely r/t UPJ stenosis  3/25 US Abdomen  Left Ovarian Hemorrhagic Cyst, pelvic free fluid w possible rupture hemorrhagic cyst  Barriers to Discharge:  From [facility]. SIRS/b/l ovarian cyst/right hydronephrosis/Trichomoniasis. OB-GYN/Urology following  PCP: client has list; will let staff know when decision made  Readmission Risk Score: 6%    Patient Goals/Plan/Treatment Preferences: met w client; plans home alone independently as PTA when medically cleared, monitor for med assist as self pay status; Public Benefits following  Transportation/Food Security/Housekeeping Addressed:  No issues identified. 3/26/21, 10:36 AM EDT    Patient goals/plan/ treatment preferences discussed by  and . Patient goals/plan/ treatment preferences reviewed with patient/ family. Patient/ family verbalize understanding of discharge plan and are in agreement with goal/plan/treatment preferences. Understanding was demonstrated using the teach back method. AVS provided by RN at time of discharge, which includes all necessary medical information pertaining to the patients current course of illness, treatment, post-discharge goals of care, and treatment preferences.

## 2021-03-26 NOTE — PROGRESS NOTES
Hospitalist Progress Note    Patient:  Stephon Serrano      Unit/Bed:4K-21/021-A    YOB: 2000    MRN: [id number]       Acct: [de-identified]     PCP: No primary care provider on file. Date of Admission: 3/25/2021    Assessment/Plan:    1. Sepsis 2/2 to suspected PUD, resolving - meets SIRS criteria (2/4 with heart rate 127, WBC 18.8). Received 2L normal saline in ED, cefoxitin 2g IV x1 and doxycycline PO. CT abdomen reveals pelvic congestion syndrome. Trichomonas seen on wet prep. C.trachomatis/N.gonorrhoeae->pending results. Continue Cefoxitin 2g q6h, Doxycycline 100 mg q12h, Flagyl 500 mg BID. Continue 150 cc/hr normal saline. OB/GYN consulted/appreciate recommendations. At discharge patient will need to complete 14 day course with Doxycycline and 7 day course with Flagyl. Check CBC in the morning. 2. Vaginal bleeding 2/2 to hemorrhagic cyst of left ovary, resolved- seen on U/S abdomen/pelvis. Continue to monitor Hemoglobin and transfuse if <7.  3. Hypotension 2/2 to recent blood loss - no lightheadedness, dizziness. Continue Normal saline 150 cc/hr. Continue to monitor. 4. Mild right hydronephrosis - seen on CT abdomen/pelvis. Urology consulted, appreciate recommendations. Expected discharge date:  TBD     Disposition:    [x] Home       [] TCU       [] Rehab       [] Psych       [] SNF       [] [facility]       [] Other-    Chief Complaint: Vaginal bleeding/abdominal pain     Hospital Course: Per prior HPI \"22 y.o. female with no significant past medical history, who presented to [address] [place] [address] with chief complaint of vaginal bleeding.     Patient reports that her last menstrual was on 3/17 to 3/21,  then it stopped for a few hours and then around the 22nd, she started  having vaginal bleeding again.  She reports that she has regular monthly period,  lasting 3 to 4 days, using 3-4 tampons per day, but this time she has been using roughly 7 tampons per day, and noticed blood clots on her vaginal bleeding. Patient denies vaginal discharge, dysuria, urinary frequency, urgency, hematuria, abdominal pain, flank pain, fever. She reports that on 3/22/2021, she also started having constant right pelvic pain, 6-8 out of 10 in intensity, nonradiating, sharp. Reports chills and nausea/vomiting that started today, few hours prior ER arrival. Patient admits that she is sexually active, last sexual contact in January/2021. Denies history of STI. She denies history of pregnancy or miscarriage before. Due to vaginal bleeding and right pelvic pain, patient presented to ER. In ER, initial vital signs temperature: 98.9 °F, RR 18, , /76, blood pressure dropped to 90s/50s. Labs CMP normal, serum pregnancy test-> negative. CBC showed WBC 18.8, otherwise normal.  UA unremarkable except for moderate blood. Chlamydia trachomatis, gonorrhea, genital wet prep, KOH, genital culture, blood cultures were obtained in the ED. Ultrasound of abdomen and TVS: no ovarian torsion, small follicle right ovary, or gliosis right side, hemorrhagic cyst left ovary, small amount of moderately echogenic free fluid in the pelvis, likely related to recent rupture of a hemorrhagic cyst.      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 In the ER, patient received 2 L IV fluid bolus, cefoxitin 2 g IV and doxycycline 100 mg PO x1 dose, Toradol 15 mg IV, fentanyl 50 mg IV x1 dose and Zofran 4 mg IV x1 dose. Subjective (past 24 hours):     Seen and examined this morning. Denies fever/chills. Denies chest pain/shortness of breath. Denies abdominal pain. Remains afebrile. ROS (12 point review of systems completed.   Pertinent positives noted. Otherwise ROS is negative). Medications:  Reviewed    Infusion Medications    sodium chloride 150 mL/hr at 03/26/21 0907    sodium chloride 100 mL/hr at 03/26/21 9048     Scheduled Medications    sodium chloride flush  10 mL Intravenous 2 times per day    doxycycline hyclate  100 mg Oral 2 times per day    cefOXitin  2,000 mg Intravenous Q6H    potassium replacement protocol   Other RX Placeholder    metroNIDAZOLE  500 mg Oral 2 times per day     PRN Meds: sodium chloride flush, acetaminophen **OR** acetaminophen, polyethylene glycol, potassium chloride **OR** potassium alternative oral replacement **OR** potassium chloride, ondansetron, morphine      Intake/Output Summary (Last 24 hours) at 3/26/2021 1443  Last data filed at 3/26/2021 0906  Gross per 24 hour   Intake 10 ml   Output --   Net 10 ml       Diet:  DIET GENERAL;    Exam:  BP (!) 110/59   Pulse 100   Temp 98.6 °F (37 °C) (Oral)   Resp 20   Ht 5' 10\" (1.778 m)   Wt 185 lb (83.9 kg)   LMP 03/16/2021   SpO2 97%   BMI 26.54 kg/m²     General appearance: No apparent distress, appears stated age and cooperative. HEENT: Pupils equal, round, and reactive to light. Conjunctivae/corneas clear. Neck: Supple, with full range of motion. No jugular venous distention. Trachea midline. Respiratory:  Normal respiratory effort. Clear to auscultation, bilaterally without Rales/Wheezes/Rhonchi. Cardiovascular: Regular rate and rhythm with normal S1/S2 without murmurs, rubs or gallops. Abdomen: Soft, non-tender, non-distended with normal bowel sounds. Musculoskeletal: passive and active ROM x 4 extremities. Skin: Skin color, texture, turgor normal.  No rashes or lesions. Neurologic:  Neurovascularly intact without any focal sensory/motor deficits.  Cranial nerves: II-XII intact, grossly non-focal.  Psychiatric: Alert and oriented, thought content appropriate, normal insight  Capillary Refill: Brisk,< 3 seconds   Peripheral Pulses: +2 palpable, equal bilaterally       Labs:   Recent Labs     03/25/21  1115 03/26/21  1133   WBC 18.8* 15.6*   HGB 12.4 10.2*   HCT 40.2 32.9*    192     Recent Labs     03/25/21  1115 03/26/21  0950    142   K 4.1 3.5    110   CO2 23 20*   BUN 8 9   CREATININE 0.6 0.4   CALCIUM 9.1 8.5     Recent Labs     03/25/21  1115 03/26/21  0950   AST 16 11   ALT 9* 6*   BILITOT 0.4 0.5   ALKPHOS 72 61     Recent Labs     03/25/21  1115   INR 1.03     No results for input(s): Fern Older in the last 72 hours. Microbiology:      Urinalysis:      Lab Results   Component Value Date    NITRU NEGATIVE 03/25/2021    WBCUA 0-2 03/25/2021    BACTERIA NONE SEEN 03/25/2021    RBCUA 0-2 03/25/2021    BLOODU MODERATE 03/25/2021    GLUCOSEU NEGATIVE 03/25/2021       Radiology:  CT ABDOMEN PELVIS W IV CONTRAST Additional Contrast? None   Final Result   Addendum 1 of 1   ADDENDUM #1      The appendix is air-filled and normal caliber. There is no periappendiceal    or pericecal inflammatory process      Final report electronically signed by Dr. Stephany Pena on 3/25/2021 3:30 PM       ORIGINAL REPORT    CT ABDOMEN AND PELVIS WITH CONTRAST ENHANCEMENT:      CLINICAL INFORMATION: Abdominal Pain      COMPARISON: No prior study. TECHNIQUE: Multiple axial 5 mm images of the abdomen, pelvis, and lung    bases were obtained following the administration of oral and intravenous    contrast material (ISOVUE). Computer generated sagittal and coronal images    of the abdomen and pelvis were also    reconstructed. ALL CT SCANS AT THIS FACILITY use dose modulation,    iterative reconstruction, and/or weight-based dosing when appropriate to    reduce radiation dose to as low as reasonably achievable. FINDINGS:       Lung bases: Unremarkable. No infiltrates. No effusions. No lung nodules. Liver: Liver unremarkable.  Gallbladder unremarkable      Pancreas, spleen and adrenal glands: Unremarkable      Kidneys:  Mild hydronephrosis right side but no hydroureter, suggesting an    element of UPJ stenosis. Kidneys otherwise unremarkable. No calculi. No    cysts or mass lesions are seen. Bowel/Peritoneum: No abnormally dilated bowel loops are seen. No free air. No free fluid in the abdomen. No abnormally enlarged para-aortic lymph    nodes are seen. Bones : No evidence for acute fracture or bone destruction. .      Pelvis: Urinary bladder unremarkable. 26 mm benign-appearing right ovarian    cyst. The uterus is dextroverted and anteflexed. Small amount of free    fluid in the pelvis. Mild prominence of the gonadal veins bilaterally and    several engorged varices in both    adnexal regions, consistent with pelvic congestion syndrome. Final      US DUP ABD PEL RETRO SCROT COMPLETE   Final Result   1. No evidence for ovarian torsion. 2. Small follicles right ovary. Paraovarian cyst right side. 3. Hemorrhagic cyst left ovary. 4. There is a small amount of moderately echogenic free fluid in the pelvis, likely related to recent rupture of a hemorrhagic cyst.   5. Follow-up pelvic ultrasound in the next several weeks might be considered for further evaluation. This report has been created using voice recognition software. It may contain minor errors which are inherent in voice recognition technology. Final report electronically signed by Dr. Reinaldo Bright on 3/25/2021 2:09 PM      US NON OB TRANSVAGINAL   Final Result   1. No evidence for ovarian torsion. 2. Small follicles right ovary. Paraovarian cyst right side. 3. Hemorrhagic cyst left ovary. 4. There is a small amount of moderately echogenic free fluid in the pelvis, likely related to recent rupture of a hemorrhagic cyst.   5. Follow-up pelvic ultrasound in the next several weeks might be considered for further evaluation. **This report has been created using voice recognition software.   It may contain minor errors which are inherent in voice recognition technology. **      Final report electronically signed by Dr. Christiano Doshi on 3/25/2021 2:09 PM          DVT prophylaxis: [] Lovenox                                 [x] SCDs                                 [] SQ Heparin                                 [] Encourage ambulation           [] Already on Anticoagulation     Code Status: Full Code    PT/OT Eval Status: N/A  Tele:   [x] yes             [] no    Electronically signed by Jt Keith MD PGY-1 Internal Medicine Resident on 3/26/2021 at 2:43 PM

## 2021-03-26 NOTE — CONSULTS
Consulting Physician: Dr. Arely German Date: 3/26/21  Reason for Consult: Mild right hydronephrosis    Ms. Gilmer Cohen is a 22-year-old female who presented to [facility] on 3/25/21 with vaginal bleeding and abdominal pain. She states that her LMP was on 3/17-21. She reports that she started once again 24-48 hours later. She also developed a sharp, non-radiating right lower abdominal pain of moderate severity. Associated symptoms included N/V and chills. She denies gross hematuria, dysuria, vaginal discharge, urinary frequency, urgency, dyschezia, or hematochezia. She denies any trauma to the genital or abdominal area. She reports a history of sexually transmitted disease. She denies kidney stone history or recurrent urinary tract infections. A transvaginal US was obtained demonstrating a 2.9 cm hemorrhagic cyst of the left ovary and a small amount of fluid in the pelvis. CT abdomen and pelvis demonstrates findings consistent with pelvic congestion syndrome. Mild right hydronephrosis was noted without hydroureter. Left UPJ stenosis was expected. Urology was consulted for further evaluation. Past Medical History:   Diagnosis Date    Depression        Past Surgical History:   Procedure Laterality Date    SCALP SURGERY         No current facility-administered medications on file prior to encounter. Current Outpatient Medications on File Prior to Encounter   Medication Sig Dispense Refill    acetaminophen (TYLENOL) 325 MG tablet Take 650 mg by mouth every 6 hours as needed for Pain         No Known Allergies    History reviewed. No pertinent family history.     Social History     Socioeconomic History    Marital status: Single     Spouse name: Not on file    Number of children: Not on file    Years of education: Not on file    Highest education level: Not on file   Occupational History    Not on file   Social Needs    Financial resource strain: Not on file    Food insecurity     Worry: Not on file Inability: Not on file    Transportation needs     Medical: Not on file     Non-medical: Not on file   Tobacco Use    Smoking status: Never Smoker    Smokeless tobacco: Never Used   Substance and Sexual Activity    Alcohol use: No    Drug use: No     Types: Marijuana     Comment: Last usage about 2 weeks ago    Sexual activity: Not on file   Lifestyle    Physical activity     Days per week: Not on file     Minutes per session: Not on file    Stress: Not on file   Relationships    Social connections     Talks on phone: Not on file     Gets together: Not on file     Attends Anabaptist service: Not on file     Active member of club or organization: Not on file     Attends meetings of clubs or organizations: Not on file     Relationship status: Not on file    Intimate partner violence     Fear of current or ex partner: Not on file     Emotionally abused: Not on file     Physically abused: Not on file     Forced sexual activity: Not on file   Other Topics Concern    Not on file   Social History Narrative    Not on file       Review of Systems  No problems with ears, nose or throat. No problems with eyes. No chest pain, shortness of breath, abdominal pain, extremity pain or weakness, and no neurological deficits. No rashes. No swollen glands or lymph nodes.  and GYN symptoms per HPI. The remainder of the review of symptoms is negative. Exam  BP (!) 110/59   Pulse 100   Temp 98.6 °F (37 °C) (Oral)   Resp 20   Ht 5' 10\" (1.778 m)   Wt 185 lb (83.9 kg)   LMP 03/16/2021   SpO2 97%   BMI 26.54 kg/m²     Constitutional: Oriented to person, place, and time. Vital signs are relatively unremarkable. Appears well-developed and well-nourished. Cooperative. No distress. HENT:    Head: Normocephalic and atraumatic.    Eyes: EOM are normal. Pupils are equal, round, and reactive to light. Right eye exhibits no discharge. Left eye exhibits no discharge. No scleral icterus.    Neck: Trachea normal. No JVD present. Cardiovascular: Mild sinus tachycardia without murmur. Pulmonary/Chest: Effort normal. No respiratory distress. No wheezes, rhonchi, or rales. Abdominal: Soft. Exhibits no distension. Diffuse moderate tenderness, especially in right lower quadrant. There is no rigidity or rebound. No CVA tenderness. GYN: Exam deferred at this time. Musculoskeletal: No pitting edema or calf tenderness. Lymphadenopathy:   Right: No supraclavicular adenopathy present. Left: No supraclavicular adenopathy present. Neurological: Alert and oriented to person, place, and time. No cranial nerve deficit. Skin: Skin is warm and dry. Not diaphoretic. Psychiatric: Normal mood and affect. Behavior is normal.   Nursing note and vitals reviewed. Labs  WBC: 18.8, segs 16.2  BHCG: Negative  Lactic acid: 1.7  Blood culture x 2: Preliminary no growth    Lab Results   Component Value Date    CREATININE 0.6 03/25/2021    BUN 8 03/25/2021     03/25/2021    K 4.1 03/25/2021     03/25/2021    CO2 23 03/25/2021     Urine: Moderate blood only  KOH: No yeast  Wet prep: Positive trichomonas  GC/Chlamydia: Pending      Radiology:     CT abdomen and pelvis    FINDINGS:      Lung bases: Unremarkable. No infiltrates. No effusions. No lung nodules.     Liver: Liver unremarkable. Gallbladder unremarkable     Pancreas, spleen and adrenal glands: Unremarkable     Kidneys:  Mild hydronephrosis right side but no hydroureter, suggesting an element of UPJ stenosis. Kidneys otherwise unremarkable. No calculi. No cysts or mass lesions are seen.     Bowel/Peritoneum: No abnormally dilated bowel loops are seen. No free air. No free fluid in the abdomen. No abnormally enlarged para-aortic lymph nodes are seen.     Bones : No evidence for acute fracture or bone destruction. .     Pelvis: Urinary bladder unremarkable. 26 mm benign-appearing right ovarian cyst. The uterus is dextroverted and anteflexed.  Small amount of free fluid in the and Doxycycline in the ED. On Flagyl for Trichomonas. Other cultures pending at this time. Leukocytosis with left shift. Blood cultures pending. 3. Pelvic Congestion Syndrome- Transvaginal US as above. GYN consulted. Discussed with Dr. Fernanda Osuna and Dr. Jorge Gee.       Thong David PA-C  3/26/21

## 2021-03-27 VITALS
HEIGHT: 70 IN | HEART RATE: 86 BPM | OXYGEN SATURATION: 98 % | BODY MASS INDEX: 27.13 KG/M2 | SYSTOLIC BLOOD PRESSURE: 108 MMHG | WEIGHT: 189.5 LBS | DIASTOLIC BLOOD PRESSURE: 74 MMHG | RESPIRATION RATE: 16 BRPM | TEMPERATURE: 98.3 F

## 2021-03-27 LAB
ANION GAP SERPL CALCULATED.3IONS-SCNC: 8 MEQ/L (ref 8–16)
BASOPHILS # BLD: 0.2 %
BASOPHILS ABSOLUTE: 0 THOU/MM3 (ref 0–0.1)
BUN BLDV-MCNC: 6 MG/DL (ref 7–22)
CALCIUM SERPL-MCNC: 8.3 MG/DL (ref 8.5–10.5)
CHLORIDE BLD-SCNC: 110 MEQ/L (ref 98–111)
CO2: 22 MEQ/L (ref 23–33)
CREAT SERPL-MCNC: 0.7 MG/DL (ref 0.4–1.2)
EOSINOPHIL # BLD: 0.4 %
EOSINOPHILS ABSOLUTE: 0 THOU/MM3 (ref 0–0.4)
ERYTHROCYTE [DISTWIDTH] IN BLOOD BY AUTOMATED COUNT: 13.3 % (ref 11.5–14.5)
ERYTHROCYTE [DISTWIDTH] IN BLOOD BY AUTOMATED COUNT: 44.1 FL (ref 35–45)
GFR SERPL CREATININE-BSD FRML MDRD: > 90 ML/MIN/1.73M2
GLUCOSE BLD-MCNC: 101 MG/DL (ref 70–108)
HCT VFR BLD CALC: 30.5 % (ref 37–47)
HEMOGLOBIN: 9.5 GM/DL (ref 12–16)
IMMATURE GRANS (ABS): 0.03 THOU/MM3 (ref 0–0.07)
IMMATURE GRANULOCYTES: 0.3 %
LYMPHOCYTES # BLD: 17.9 %
LYMPHOCYTES ABSOLUTE: 1.8 THOU/MM3 (ref 1–4.8)
MCH RBC QN AUTO: 28.1 PG (ref 26–33)
MCHC RBC AUTO-ENTMCNC: 31.1 GM/DL (ref 32.2–35.5)
MCV RBC AUTO: 90.2 FL (ref 81–99)
MONOCYTES # BLD: 6 %
MONOCYTES ABSOLUTE: 0.6 THOU/MM3 (ref 0.4–1.3)
MRSA SCREEN: NORMAL
NUCLEATED RED BLOOD CELLS: 0 /100 WBC
PLATELET # BLD: 182 THOU/MM3 (ref 130–400)
PMV BLD AUTO: 10.4 FL (ref 9.4–12.4)
POTASSIUM SERPL-SCNC: 4 MEQ/L (ref 3.5–5.2)
RBC # BLD: 3.38 MILL/MM3 (ref 4.2–5.4)
SEG NEUTROPHILS: 75.2 %
SEGMENTED NEUTROPHILS ABSOLUTE COUNT: 7.5 THOU/MM3 (ref 1.8–7.7)
SODIUM BLD-SCNC: 140 MEQ/L (ref 135–145)
WBC # BLD: 10 THOU/MM3 (ref 4.8–10.8)

## 2021-03-27 PROCEDURE — 6370000000 HC RX 637 (ALT 250 FOR IP): Performed by: FAMILY MEDICINE

## 2021-03-27 PROCEDURE — 80048 BASIC METABOLIC PNL TOTAL CA: CPT

## 2021-03-27 PROCEDURE — 6360000002 HC RX W HCPCS: Performed by: STUDENT IN AN ORGANIZED HEALTH CARE EDUCATION/TRAINING PROGRAM

## 2021-03-27 PROCEDURE — 85025 COMPLETE CBC W/AUTO DIFF WBC: CPT

## 2021-03-27 PROCEDURE — 99238 HOSP IP/OBS DSCHRG MGMT 30/<: CPT | Performed by: INTERNAL MEDICINE

## 2021-03-27 PROCEDURE — 2580000003 HC RX 258: Performed by: FAMILY MEDICINE

## 2021-03-27 PROCEDURE — 2580000003 HC RX 258: Performed by: STUDENT IN AN ORGANIZED HEALTH CARE EDUCATION/TRAINING PROGRAM

## 2021-03-27 PROCEDURE — 36415 COLL VENOUS BLD VENIPUNCTURE: CPT

## 2021-03-27 PROCEDURE — 6370000000 HC RX 637 (ALT 250 FOR IP): Performed by: STUDENT IN AN ORGANIZED HEALTH CARE EDUCATION/TRAINING PROGRAM

## 2021-03-27 RX ORDER — DOXYCYCLINE HYCLATE 100 MG
100 TABLET ORAL 2 TIMES DAILY
Qty: 26 TABLET | Refills: 0 | Status: SHIPPED | OUTPATIENT
Start: 2021-03-27 | End: 2021-04-09

## 2021-03-27 RX ORDER — METRONIDAZOLE 500 MG/1
500 TABLET ORAL 2 TIMES DAILY
Qty: 10 TABLET | Refills: 0 | Status: SHIPPED | OUTPATIENT
Start: 2021-03-27 | End: 2021-04-01

## 2021-03-27 RX ADMIN — Medication 1 CAPSULE: at 09:05

## 2021-03-27 RX ADMIN — CEFOXITIN SODIUM 2000 MG: 2 POWDER, FOR SOLUTION INTRAVENOUS at 10:28

## 2021-03-27 RX ADMIN — SODIUM CHLORIDE: 9 INJECTION, SOLUTION INTRAVENOUS at 03:26

## 2021-03-27 RX ADMIN — CEFOXITIN SODIUM 2000 MG: 2 POWDER, FOR SOLUTION INTRAVENOUS at 03:58

## 2021-03-27 RX ADMIN — METRONIDAZOLE 500 MG: 500 TABLET ORAL at 09:05

## 2021-03-27 RX ADMIN — DOXYCYCLINE HYCLATE 100 MG: 100 TABLET, COATED ORAL at 09:05

## 2021-03-27 ASSESSMENT — PAIN SCALES - GENERAL
PAINLEVEL_OUTOF10: 0
PAINLEVEL_OUTOF10: 0

## 2021-03-27 NOTE — DISCHARGE SUMMARY
Hospital Medicine Discharge Summary      Patient Identification:   Franklin Tate   : 2000  MRN: [id number]   Account: [de-identified]      Patient's PCP: No primary care provider on file. Admit Date: 3/25/2021     Discharge Date:   3/27/2021    Admitting Physician: Alisha Mosqueda DO     Discharge Physician: Lisa Manzo MD PGY-1    Discharge Diagnoses:    1. Sepsis 2/2 to suspected PUD, resolved - meets SIRS criteria (2/4 with heart rate 127, WBC 18.8). Received 2L normal saline in ED, cefoxitin 2g IV x1 and doxycycline PO. CT abdomen reveals pelvic congestion syndrome. Trichomonas seen on wet prep. C.trachomatis/N.gonorrhoeae->pending results. 3/27 Patient hemodynamically stable with reassuring vital signs. Will continue with Doxycycline 100 mg twice day x13 days and Flagyl 500 mg twice a day x5 days at discharge for completion of abx treatment. Patient to follow with OB/GYN as outpatient in the next 10-14 days. 2. Vaginal bleeding 2/2 to hemorrhagic cyst of left ovary, resolved- seen on U/S abdomen/pelvis. 3/27 Hg/Hct 9.5/30.5, stable. 3. Hypotension 2/2 to recent blood loss, resolved - no lightheadedness, dizziness. Orthostatics negative. 4. Mild right hydronephrosis - seen on CT abdomen/pelvis. Urology consulted with recommendations for outpatient follow up. The patient was seen and examined on day of discharge and this discharge summary is in conjunction with any daily progress note from day of discharge. Hospital Course:   Franklin Tate is a 23 y.o. female admitted to [address] [place] [address] on 3/25/2021 for evaluation of vaginal bleeding. Patient reports that her last menstrual was on 3/17 to 3/21,  then it stopped for a few hours and then around the , she started  having vaginal bleeding again.  She reports that she has regular monthly period,  lasting 3 to 4 days, using 3-4 tampons per day, but this time she has been using roughly 7 tampons per day, and noticed blood clots on her vaginal bleeding.  Patient denies vaginal discharge, dysuria, urinary frequency, urgency, hematuria, abdominal pain, flank pain, fever.  She reports that on 3/22/2021, she also started having constant right pelvic pain, 6-8 out of 10 in intensity, nonradiating, sharp. Reports chills and nausea/vomiting that started today, few hours prior ER arrival. Patient admits that she is sexually active, last sexual contact in January/2021.  Denies history of STI.  She denies history of pregnancy or miscarriage before.  Due to vaginal bleeding and right pelvic pain, patient presented to ER.     In ER, initial vital signs temperature: 98.9 °F, RR 18, , /76, blood pressure dropped to 90s/50s.  Labs CMP normal, serum pregnancy test-> negative.  CBC showed WBC 18.8, otherwise normal.  UA unremarkable except for moderate blood.  Chlamydia trachomatis, gonorrhea, genital wet prep, KOH, genital culture, blood cultures were obtained in the ED.  Ultrasound of abdomen and TVS: no ovarian torsion, small follicle right ovary, or gliosis right side, hemorrhagic cyst left ovary, small amount of moderately echogenic free fluid in the pelvis, likely related to recent rupture of a hemorrhagic cyst.       CT abdomen/pelvis showed findings of pelvic congestion syndrome, benign appearing right ovarian cyst, small amount of free fluid in the cul-de-sac, likely from a recently ruptured cyst or follicle; Mild hydronephrosis right side but no hydroureter, likely related to an element of UPJ stenosis.  In the ER, patient received 2 L IV fluid bolus, cefoxitin 2 g IV and doxycycline 100 mg PO x1 dose, Toradol 15 mg IV, fentanyl 50 mg IV x1 dose and Zofran 4 mg IV x1 dose.           Exam:     Vitals:  Vitals:    03/26/21 2332 03/27/21 0315 03/27/21 0900 03/27/21 1100   BP: 106/64 (!) 107/59 120/80 108/74   Pulse: 108 100 85 86   Resp: 16 16 16 16   Temp: 98.6 °F (37 °C) 98 °F (36.7 °C) 98.3 °F (36.8 °C) 98.3 °F (36.8 °C)   TempSrc: Oral Oral Oral Oral   SpO2: 97% 98% 97% 98%   Weight:  189 lb 8 oz (86 kg)     Height:         Weight: Weight: 189 lb 8 oz (86 kg)     24 hour intake/output:    Intake/Output Summary (Last 24 hours) at 3/27/2021 1109  Last data filed at 3/27/2021 0315  Gross per 24 hour   Intake 4742.47 ml   Output 2675 ml   Net 2067.47 ml         General appearance:  No apparent distress, appears stated age and cooperative. HEENT:  Normal cephalic, atraumatic without obvious deformity. Pupils equal, round, and reactive to light. Extra ocular muscles intact. Conjunctivae/corneas clear. Neck: Supple, with full range of motion. No jugular venous distention. Trachea midline. Respiratory:  Normal respiratory effort. Clear to auscultation, bilaterally without Rales/Wheezes/Rhonchi. Cardiovascular:  Regular rate and rhythm with normal S1/S2 without murmurs, rubs or gallops. Abdomen: Soft, non-tender, non-distended with normal bowel sounds. Musculoskeletal:  No clubbing, cyanosis or edema bilaterally. Full range of motion without deformity. Skin: Skin color, texture, turgor normal.  No rashes or lesions. Neurologic:  Neurovascularly intact without any focal sensory/motor deficits. Cranial nerves: II-XII intact, grossly non-focal.  Psychiatric:  Alert and oriented, thought content appropriate, normal insight  Capillary Refill: Brisk,< 3 seconds   Peripheral Pulses: +2 palpable, equal bilaterally       Labs:  For convenience and continuity at follow-up the following most recent labs are provided:      CBC:    Lab Results   Component Value Date    WBC 10.0 03/27/2021    HGB 9.5 03/27/2021    HCT 30.5 03/27/2021     03/27/2021       Renal:    Lab Results   Component Value Date     03/27/2021    K 4.0 03/27/2021    K 3.5 03/26/2021     03/27/2021    CO2 22 03/27/2021    BUN 6 03/27/2021    CREATININE 0.7 03/27/2021    CALCIUM 8.3 03/27/2021         Significant Diagnostic Studies    Radiology: CT ABDOMEN PELVIS W IV CONTRAST Additional Contrast? None   Final Result   Addendum 1 of 1    ADDENDUM #1      The appendix is air-filled and normal caliber. There is no periappendiceal    or pericecal inflammatory process      Final report electronically signed by Dr. Ewa Perez on 3/25/2021 3:30 PM       ORIGINAL REPORT    CT ABDOMEN AND PELVIS WITH CONTRAST ENHANCEMENT:      CLINICAL INFORMATION: Abdominal Pain      COMPARISON: No prior study. TECHNIQUE: Multiple axial 5 mm images of the abdomen, pelvis, and lung    bases were obtained following the administration of oral and intravenous    contrast material (ISOVUE). Computer generated sagittal and coronal images    of the abdomen and pelvis were also    reconstructed. ALL CT SCANS AT THIS FACILITY use dose modulation,    iterative reconstruction, and/or weight-based dosing when appropriate to    reduce radiation dose to as low as reasonably achievable. FINDINGS:       Lung bases: Unremarkable. No infiltrates. No effusions. No lung nodules. Liver: Liver unremarkable. Gallbladder unremarkable      Pancreas, spleen and adrenal glands: Unremarkable      Kidneys:  Mild hydronephrosis right side but no hydroureter, suggesting an    element of UPJ stenosis. Kidneys otherwise unremarkable. No calculi. No    cysts or mass lesions are seen. Bowel/Peritoneum: No abnormally dilated bowel loops are seen. No free air. No free fluid in the abdomen. No abnormally enlarged para-aortic lymph    nodes are seen. Bones : No evidence for acute fracture or bone destruction. .      Pelvis: Urinary bladder unremarkable. 26 mm benign-appearing right ovarian    cyst. The uterus is dextroverted and anteflexed. Small amount of free    fluid in the pelvis. Mild prominence of the gonadal veins bilaterally and    several engorged varices in both    adnexal regions, consistent with pelvic congestion syndrome.          Final      US DUP ABD PEL RETRO SCROT COMPLETE   Final Result   1. No evidence for ovarian torsion. 2. Small follicles right ovary. Paraovarian cyst right side. 3. Hemorrhagic cyst left ovary. 4. There is a small amount of moderately echogenic free fluid in the pelvis, likely related to recent rupture of a hemorrhagic cyst.   5. Follow-up pelvic ultrasound in the next several weeks might be considered for further evaluation. **This report has been created using voice recognition software. It may contain minor errors which are inherent in voice recognition technology. **      Final report electronically signed by Dr. Paige Barbosa on 3/25/2021 2:09 PM      US NON OB TRANSVAGINAL   Final Result   1. No evidence for ovarian torsion. 2. Small follicles right ovary. Paraovarian cyst right side. 3. Hemorrhagic cyst left ovary. 4. There is a small amount of moderately echogenic free fluid in the pelvis, likely related to recent rupture of a hemorrhagic cyst.   5. Follow-up pelvic ultrasound in the next several weeks might be considered for further evaluation. **This report has been created using voice recognition software. It may contain minor errors which are inherent in voice recognition technology. **      Final report electronically signed by Dr. Paige Barbosa on 3/25/2021 2:09 PM             Consults:     IP CONSULT TO OB GYN    Disposition: Home  Condition at Discharge: Stable    Code Status:  Full Code     Patient Instructions:    Discharge lab work: N/A  Activity: activity as tolerated  Diet: DIET GENERAL;      Follow-up visits:   [facility] [address] [id number]  Go today      Antony Adriano, [facility] [address]  [phone]    Call  As needed    Dmitry Tejada MD  [place] [address]  [facility] [facility] [phone]    Schedule an appointment as soon as possible for a visit in 1 week  For post hospital follow-up (Mild Right Hydronephrosis),

## 2021-03-27 NOTE — PLAN OF CARE
Problem: Pain:  Goal: Pain level will decrease  Description: Pain level will decrease  Outcome: Ongoing  Note: Pain Assessment: 0-10  Pain Level: 0   Patient's Stated Pain Goal: No pain   Is pain goal met at this time? Yes     Non-Pharmaceutical Pain Intervention(s): Rest, Repositioned       Problem: Infection:  Goal: Will remain free from infection  Description: Will remain free from infection  Outcome: Ongoing  Note: WBC elevated. Labs monitored daily. Pt receiving doxycycline, flagyl, and Mefoxin. Pt afebrile overnight. Will continue to monitor. Problem: Cardiovascular  Goal: Hemodynamic stability  Outcome: Ongoing  Note: Vital signs stable overnight. Labs monitored daily. Problem:   Goal: Adequate urinary output  Outcome: Ongoing  Note: Intake and output monitored. Urine clear and yellow with adequate output. Problem: Skin Integrity/Risk  Goal: No skin breakdown during hospitalization  Outcome: Ongoing  Note: No skin breakdown noted this shift. Pt able to ambulate and reposition self independently. Pillow support provided. Problem: Falls - Risk of:  Goal: Will remain free from falls  Description: Will remain free from falls  Outcome: Ongoing  Note: Pt free of falls this shift. Fall precautions in place. Bed alarm on. Call light and patient belongings within reach. Care plan reviewed with patient. Patient verbalize understanding of the plan of care and contribute to goal setting.

## 2021-03-27 NOTE — CONSULTS
Department of Gynecology  Consult Note      Reason for Consult:  Fever probable PID  Requesting Physician:  Dr Vania Aponte:   Vaginal Bleeding and abd pain    History obtained from patient    HISTORY OF PRESENT ILLNESS:    Patient is a 64-year-old G0 patient with a last menstrual period of 3/17/2021. She presented to the emergency room after her. Was irregular and became very heavy and painful. She did not notice any fever or chills and denied nausea vomiting diarrhea or constipation she also did not notice any other vaginal discharge or dysuria. Her pain got bad enough that she came to the emergency room yesterday. She has not been sexually active since January and then with one partner at that time. She has had 4 partners lifetime and one partner over the last 6 months. Her periods previously have been normal 28-day cycles with 3 days of bleeding. Menarche was at age 13. On admission here her white blood cell count was 18 she was afebrile and ultrasound small bilateral ovarian cysts and a small amount of fluid in the cul-de-sac she was found to have trichomonas and was admitted for presumed pelvic inflammatory disease and placed on antibiotics. Past Medical History:        Diagnosis Date    Depression      Past Surgical History:        Procedure Laterality Date    SCALP SURGERY         Past Gynecological History:    1. Last menstrual period:  3/17/2021  2. Menses: age of menarche:  13years old  interval:  4 weeks  duration:  3 day(s)  3. Contraception: None  4. Sexually transmitted disease history: Trichomonas        A.  Number of sexual partners in the last 6 months: 1    meds:  Current Facility-Administered Medications:     sodium chloride flush 0.9 % injection 10 mL, 10 mL, Intravenous, 2 times per day, Hi Stratton MD, 10 mL at 03/26/21 0906    sodium chloride flush 0.9 % injection 10 mL, 10 mL, Intravenous, PRN, Ma Elisabet Scott MD    acetaminophen (TYLENOL) tablet 650 mg, 650 mg, Oral, Q6H PRN, 650 mg at 03/26/21 1507 **OR** acetaminophen (TYLENOL) suppository 650 mg, 650 mg, Rectal, Q6H PRN, Giovani Velazquez MD    0.9 % sodium chloride infusion, , Intravenous, Continuous, Giovani Velazquez MD, Last Rate: 150 mL/hr at 03/26/21 2024, New Bag at 03/26/21 2024    doxycycline hyclate (VIBRA-TABS) tablet 100 mg, 100 mg, Oral, 2 times per day, Jorge Ascencio MD, 100 mg at 03/26/21 0945    polyethylene glycol (GLYCOLAX) packet 17 g, 17 g, Oral, Daily PRN, Jorge Ascencio MD    cefOXitin (MEFOXIN) 2000 mg in dextrose 5% 50 mL (mini-bag), 2,000 mg, Intravenous, Q6H, Evelia Roque MD, Stopped at 03/26/21 1640    potassium replacement protocol, , Other, RX Placeholder, Evelia Roque MD    potassium chloride (KLOR-CON M) extended release tablet 40 mEq, 40 mEq, Oral, PRN, 40 mEq at 03/26/21 1507 **OR** potassium bicarb-citric acid (EFFER-K) effervescent tablet 40 mEq, 40 mEq, Oral, PRN **OR** potassium chloride 10 mEq/100 mL IVPB (Peripheral Line), 10 mEq, Intravenous, PRN, Evelia Roque MD    lactobacillus (CULTURELLE) capsule 1 capsule, 1 capsule, Oral, Daily with breakfast, Evelia Roque MD    ondansetron (ZOFRAN) injection 4 mg, 4 mg, Intravenous, Q6H PRN, Giovani Velazquez MD, 4 mg at 03/26/21 0901    morphine (PF) injection 1 mg, 1 mg, Intravenous, Q8H PRN, Giovani Velazquez MD, 1 mg at 03/26/21 0901    metroNIDAZOLE (FLAGYL) tablet 500 mg, 500 mg, Oral, 2 times per day, Giovani Velazquez MD, 500 mg at 03/26/21 9951       Allergies:  Patient has no known allergies. Social History:  TOBACCO:  Never used tobacco  ETOH:  Never drank alcohol  MARITAL STATUS:  single  Patient currently lives with family     Family History:   History reviewed. No pertinent family history.      PHYSICAL EXAM:    Vitals:  /67   Pulse 105   Temp 98.8 °F (37.1 °C) (Oral)   Resp 16   Ht 5' 10\" (1.778 m)   Wt 185 lb (83.9 kg)   LMP 03/16/2021   SpO2 100% BMI 26.54 kg/m²     CONSTITUTIONAL:  awake, alert, cooperative, no apparent distress, and appears stated age  ABDOMEN:  no scars, soft, non-distended, tenderness noted in the right lower quadrant and in the left lower quadrant and voluntary guarding present  GENITAL/URINARY:  External Genitalia:  General appearance; normal, Hair distribution; normal, Lesions absent  Vagina:  General appearance normal, Estrogen effect normal, Discharge absent, Lesions absent, Pelvic support normal  Cervix:  Tenderness absent  Uterus:  Size normal, Contour normal, Tenderness absent  Adenexa:  Abnormal findings: bilateral tenderness      DATA:  Recent Labs     03/25/21  1115 03/26/21  1133   WBC 18.8* 15.6*   HGB 12.4 10.2*   HCT 40.2 32.9*    192     Recent Labs     03/25/21  1115 03/26/21  0950    142   K 4.1 3.5    110   CO2 23 20*   BUN 8 9   CREATININE 0.6 0.4   CALCIUM 9.1 8.5   AST 16 11   ALT 9* 6*           IMPRESSION/RECOMMENDATIONS:      Active Problems:    SIRS (systemic inflammatory response syndrome) (HCC)    PID (acute pelvic inflammatory disease)  Assessment pelvic inflammatory disease plan agree with continuing Rocephin doxycycline and metronidazole converting to oral sometime in the next 12 to 24 hours would be appropriate. Once we can be assured that she can take p.o. and the antibiotics are oral she could be discharged home to follow-up in our office. Thank you for allowing us to participate in [facility] care. We will follow with you.     Kathe Ca 3/27/2021 12:54 AM

## 2021-03-27 NOTE — PROGRESS NOTES
Patient discharged to home, patient to bring self home via private vehicle. Patient walks self down to discharge lobby to bring self home    Discharge packet went over with patient and mother including discharge instructions. Discharge instructions went over with patient include to make follow-up appointments, take antibiotics as prescribed, information regarding Trcihominiasis, Pelvic Inflammatory Disease, Flagyl, and Hydronephrosis. Patient left with belongings and discharge packet. Patient's questions and concerns were addressed, patient leaves stable and ambulatory.

## 2021-03-27 NOTE — PROGRESS NOTES
GYN PROGRESS NOTE    HD #2    SUBJECTIVE  Feels well with no pain with urination    OBJECTIVE  /80   Pulse 85   Temp 98.3 °F (36.8 °C) (Oral)   Resp 16   Ht 5' 10\" (1.778 m)   Wt 189 lb 8 oz (86 kg)   LMP 03/16/2021   SpO2 97%   BMI 27.19 kg/m²     ABD: soft NT ND  EXTREMITIES: -CCE    Lab Results   Component Value Date    WBC 10.0 03/27/2021    HGB 9.5 (L) 03/27/2021    HCT 30.5 (L) 03/27/2021    MCV 90.2 03/27/2021     03/27/2021     Lab Results   Component Value Date     03/27/2021    K 4.0 03/27/2021     03/27/2021    CO2 22 (L) 03/27/2021    BUN 6 (L) 03/27/2021    CREATININE 0.7 03/27/2021    GLUCOSE 101 03/27/2021    CALCIUM 8.3 (L) 03/27/2021    PROT 6.3 03/26/2021    LABALBU 3.4 (L) 03/26/2021    BILITOT 0.5 03/26/2021    ALKPHOS 61 03/26/2021    AST 11 03/26/2021    ALT 6 (L) 03/26/2021    LABGLOM >90 03/27/2021             Intake/Output Summary (Last 24 hours) at 3/27/2021 1053  Last data filed at 3/27/2021 0315  Gross per 24 hour   Intake 4742.47 ml   Output 2675 ml   Net 2067.47 ml       ASSESSMENT/PLAN:    PID - responded well to antibiotics. Agree with changing to PO ABX and allowing discharge to f/u in our office in 10-14 DAYS.     Gerri Ca 3/27/2021 10:56 AM

## 2021-03-28 LAB
CHLAMYDIA TRACHOMATIS AMPLIFIED DET: NEGATIVE
NEISSERIA GONORRHOEAE BY AMP: POSITIVE
SPECIMEN SOURCE: ABNORMAL

## 2021-03-29 LAB
GENITAL CULTURE, ROUTINE: ABNORMAL
GENITAL CULTURE, ROUTINE: ABNORMAL
GRAM STAIN RESULT: ABNORMAL
ORGANISM: ABNORMAL

## 2021-03-31 LAB
BLOOD CULTURE, ROUTINE: NORMAL
BLOOD CULTURE, ROUTINE: NORMAL

## 2023-08-05 ENCOUNTER — APPOINTMENT (OUTPATIENT)
Dept: GENERAL RADIOLOGY | Age: 23
End: 2023-08-05
Payer: COMMERCIAL

## 2023-08-05 ENCOUNTER — HOSPITAL ENCOUNTER (EMERGENCY)
Age: 23
Discharge: HOME | End: 2023-08-05
Payer: COMMERCIAL

## 2023-08-05 VITALS
OXYGEN SATURATION: 96 % | BODY MASS INDEX: 25.77 KG/M2 | RESPIRATION RATE: 14 BRPM | HEART RATE: 86 BPM | HEIGHT: 70 IN | WEIGHT: 180 LBS | DIASTOLIC BLOOD PRESSURE: 84 MMHG | TEMPERATURE: 98.5 F | SYSTOLIC BLOOD PRESSURE: 131 MMHG

## 2023-08-05 DIAGNOSIS — M77.42 METATARSALGIA, LEFT FOOT: Primary | ICD-10-CM

## 2023-08-05 PROCEDURE — 73630 X-RAY EXAM OF FOOT: CPT

## 2023-08-05 PROCEDURE — 99213 OFFICE O/P EST LOW 20 MIN: CPT

## 2023-08-05 PROCEDURE — 99203 OFFICE O/P NEW LOW 30 MIN: CPT | Performed by: NURSE PRACTITIONER

## 2023-08-05 RX ORDER — IBUPROFEN 400 MG/1
400 TABLET ORAL EVERY 6 HOURS PRN
Qty: 120 TABLET | Refills: 0 | Status: SHIPPED | OUTPATIENT
Start: 2023-08-05

## 2023-08-05 ASSESSMENT — ENCOUNTER SYMPTOMS
SHORTNESS OF BREATH: 0
APNEA: 0
BACK PAIN: 0
STRIDOR: 0
CHOKING: 0
WHEEZING: 0
CHEST TIGHTNESS: 0
COUGH: 0

## 2023-08-05 ASSESSMENT — PAIN SCALES - GENERAL: PAINLEVEL_OUTOF10: 6

## 2023-08-05 ASSESSMENT — PAIN DESCRIPTION - ORIENTATION: ORIENTATION: LEFT

## 2023-08-05 ASSESSMENT — PAIN - FUNCTIONAL ASSESSMENT: PAIN_FUNCTIONAL_ASSESSMENT: 0-10

## 2023-08-05 ASSESSMENT — PAIN DESCRIPTION - LOCATION: LOCATION: FOOT
